# Patient Record
Sex: FEMALE | Race: WHITE | NOT HISPANIC OR LATINO | Employment: FULL TIME | URBAN - METROPOLITAN AREA
[De-identification: names, ages, dates, MRNs, and addresses within clinical notes are randomized per-mention and may not be internally consistent; named-entity substitution may affect disease eponyms.]

---

## 2024-06-12 ENCOUNTER — HOSPITAL ENCOUNTER (OUTPATIENT)
Dept: CARDIOLOGY | Facility: HOSPITAL | Age: 29
Discharge: HOME | End: 2024-06-12
Payer: COMMERCIAL

## 2024-06-12 ENCOUNTER — HOSPITAL ENCOUNTER (OUTPATIENT)
Dept: NEUROLOGY | Facility: HOSPITAL | Age: 29
Discharge: HOME | End: 2024-06-12
Payer: COMMERCIAL

## 2024-06-12 VITALS — BODY MASS INDEX: 45.91 KG/M2 | WEIGHT: 275.57 LBS | HEIGHT: 65 IN

## 2024-06-12 DIAGNOSIS — R20.0 NUMBNESS AND TINGLING: ICD-10-CM

## 2024-06-12 DIAGNOSIS — R20.2 NUMBNESS AND TINGLING: ICD-10-CM

## 2024-06-12 DIAGNOSIS — I10 ESSENTIAL HYPERTENSION: ICD-10-CM

## 2024-06-12 LAB
ASCENDING AORTA: 2.6 CM
BH CV ECHO MEAS - AO MAX PG: 6.3 MMHG
BH CV ECHO MEAS - AO MEAN PG: 4 MMHG
BH CV ECHO MEAS - AO ROOT DIAM: 2.8 CM
BH CV ECHO MEAS - AO V2 MAX: 125.1 CM/SEC
BH CV ECHO MEAS - AO V2 VTI: 26 CM
BH CV ECHO MEAS - EF(MOD-BP): 61.9 %
BH CV ECHO MEAS - IVS/LVPW: 1 CM
BH CV ECHO MEAS - IVSD: 0.8 CM
BH CV ECHO MEAS - LA DIMENSION: 4 CM
BH CV ECHO MEAS - LAT PEAK E' VEL: 16.5 CM/SEC
BH CV ECHO MEAS - LV MAX PG: 4.7 MMHG
BH CV ECHO MEAS - LV MEAN PG: 2.3 MMHG
BH CV ECHO MEAS - LV V1 MAX: 108.2 CM/SEC
BH CV ECHO MEAS - LV V1 VTI: 22.8 CM
BH CV ECHO MEAS - LVIDD: 4.4 CM
BH CV ECHO MEAS - LVIDS: 2.9 CM
BH CV ECHO MEAS - LVOT DIAM: 2 CM
BH CV ECHO MEAS - LVPWD: 0.8 CM
BH CV ECHO MEAS - MED PEAK E' VEL: 11.6 CM/SEC
BH CV ECHO MEAS - MV A MAX VEL: 40.2 CM/SEC
BH CV ECHO MEAS - MV E MAX VEL: 92.9 CM/SEC
BH CV ECHO MEAS - MV E/A: 2.31
BH CV ECHO MEAS - MV MAX PG: 4.2 MMHG
BH CV ECHO MEAS - MV MEAN PG: 1.4 MMHG
BH CV ECHO MEAS - PA ACC TIME: 0.12 SEC
BH CV ECHO MEAS - RAP SYSTOLE: 3 MMHG
BH CV ECHO MEAS - RVSP: 24 MMHG
BH CV ECHO MEAS - TAPSE (>1.6): 1.92 CM
BH CV ECHO MEAS - TR MAX PG: 21 MMHG
BH CV ECHO MEAS - TR MAX VEL: 217.1 CM/SEC
BH CV ECHO MEASUREMENTS AVERAGE E/E' RATIO: 6.61
BH CV VAS BP RIGHT ARM: NORMAL MMHG
BH CV XLRA - RV BASE: 4.09 CM
BH CV XLRA - RV LENGTH: 6.98 CM
BH CV XLRA - RV MID: 2.84 CM
BH CV XLRA - TDI S': 11 CM/SEC

## 2024-06-12 PROCEDURE — 93306 TTE W/DOPPLER COMPLETE: CPT

## 2024-06-12 PROCEDURE — 95913 NRV CNDJ TEST 13/> STUDIES: CPT

## 2024-06-12 PROCEDURE — 95886 MUSC TEST DONE W/N TEST COMP: CPT | Performed by: PSYCHIATRY & NEUROLOGY

## 2024-06-12 PROCEDURE — 93306 TTE W/DOPPLER COMPLETE: CPT | Performed by: INTERNAL MEDICINE

## 2024-06-12 PROCEDURE — 95886 MUSC TEST DONE W/N TEST COMP: CPT

## 2024-06-12 PROCEDURE — 95913 NRV CNDJ TEST 13/> STUDIES: CPT | Performed by: PSYCHIATRY & NEUROLOGY

## 2024-06-13 ENCOUNTER — TELEPHONE (OUTPATIENT)
Dept: INTERNAL MEDICINE | Facility: CLINIC | Age: 29
End: 2024-06-13
Payer: COMMERCIAL

## 2024-06-13 NOTE — TELEPHONE ENCOUNTER
Patient viewed results on [employer]      ----- Message from Emil Miller sent at 6/13/2024  8:07 AM EDT -----  Nerve conduction study shows neuropathy.  Recommend referral to neurology for further evaluation and treatment options.

## 2024-07-04 DIAGNOSIS — R20.0 NUMBNESS AND TINGLING: Primary | ICD-10-CM

## 2024-07-04 DIAGNOSIS — R20.2 NUMBNESS AND TINGLING: Primary | ICD-10-CM

## 2024-07-08 ENCOUNTER — HOSPITAL ENCOUNTER (EMERGENCY)
Facility: HOSPITAL | Age: 29
Discharge: HOME | End: 2024-07-08
Attending: EMERGENCY MEDICINE
Payer: COMMERCIAL

## 2024-07-08 VITALS
OXYGEN SATURATION: 99 % | HEART RATE: 88 BPM | WEIGHT: 273 LBS | SYSTOLIC BLOOD PRESSURE: 126 MMHG | BODY MASS INDEX: 45.48 KG/M2 | TEMPERATURE: 98.2 F | DIASTOLIC BLOOD PRESSURE: 77 MMHG | HEIGHT: 65 IN | RESPIRATION RATE: 18 BRPM

## 2024-07-08 DIAGNOSIS — I80.8 SUPERFICIAL THROMBOPHLEBITIS OF LEFT UPPER EXTREMITY: Primary | ICD-10-CM

## 2024-07-08 DIAGNOSIS — Z98.890 HISTORY OF GYNECOLOGIC SURGERY: ICD-10-CM

## 2024-07-08 LAB
ALBUMIN SERPL-MCNC: 3.6 G/DL (ref 3.5–5.2)
ALBUMIN/GLOB SERPL: 1.2 G/DL
ALP SERPL-CCNC: 74 U/L (ref 39–117)
ALT SERPL W P-5'-P-CCNC: 21 U/L (ref 1–33)
ANION GAP SERPL CALCULATED.3IONS-SCNC: 12 MMOL/L (ref 5–15)
AST SERPL-CCNC: 13 U/L (ref 1–32)
BASOPHILS # BLD AUTO: 0.04 10*3/MM3 (ref 0–0.2)
BASOPHILS NFR BLD AUTO: 0.3 % (ref 0–1.5)
BILIRUB SERPL-MCNC: <0.2 MG/DL (ref 0–1.2)
BUN SERPL-MCNC: 13 MG/DL (ref 6–20)
BUN/CREAT SERPL: 14.6 (ref 7–25)
CALCIUM SPEC-SCNC: 8.9 MG/DL (ref 8.6–10.5)
CHLORIDE SERPL-SCNC: 109 MMOL/L (ref 98–107)
CO2 SERPL-SCNC: 20 MMOL/L (ref 22–29)
CREAT SERPL-MCNC: 0.89 MG/DL (ref 0.57–1)
CRP SERPL-MCNC: 1.93 MG/DL (ref 0–0.5)
DEPRECATED RDW RBC AUTO: 40.9 FL (ref 37–54)
EGFRCR SERPLBLD CKD-EPI 2021: 89.6 ML/MIN/1.73
EOSINOPHIL # BLD AUTO: 0.2 10*3/MM3 (ref 0–0.4)
EOSINOPHIL NFR BLD AUTO: 1.4 % (ref 0.3–6.2)
ERYTHROCYTE [DISTWIDTH] IN BLOOD BY AUTOMATED COUNT: 12.5 % (ref 12.3–15.4)
ERYTHROCYTE [SEDIMENTATION RATE] IN BLOOD: 18 MM/HR (ref 0–20)
GLOBULIN UR ELPH-MCNC: 3.1 GM/DL
GLUCOSE SERPL-MCNC: 86 MG/DL (ref 65–99)
HCT VFR BLD AUTO: 40.3 % (ref 34–46.6)
HGB BLD-MCNC: 13.7 G/DL (ref 12–15.9)
IMM GRANULOCYTES # BLD AUTO: 0.05 10*3/MM3 (ref 0–0.05)
IMM GRANULOCYTES NFR BLD AUTO: 0.4 % (ref 0–0.5)
LYMPHOCYTES # BLD AUTO: 3.69 10*3/MM3 (ref 0.7–3.1)
LYMPHOCYTES NFR BLD AUTO: 26.7 % (ref 19.6–45.3)
MCH RBC QN AUTO: 30.4 PG (ref 26.6–33)
MCHC RBC AUTO-ENTMCNC: 34 G/DL (ref 31.5–35.7)
MCV RBC AUTO: 89.6 FL (ref 79–97)
MONOCYTES # BLD AUTO: 0.9 10*3/MM3 (ref 0.1–0.9)
MONOCYTES NFR BLD AUTO: 6.5 % (ref 5–12)
NEUTROPHILS NFR BLD AUTO: 64.7 % (ref 42.7–76)
NEUTROPHILS NFR BLD AUTO: 8.92 10*3/MM3 (ref 1.7–7)
NRBC BLD AUTO-RTO: 0 /100 WBC (ref 0–0.2)
PLATELET # BLD AUTO: 330 10*3/MM3 (ref 140–450)
PMV BLD AUTO: 10.8 FL (ref 6–12)
POTASSIUM SERPL-SCNC: 4.3 MMOL/L (ref 3.5–5.2)
PROT SERPL-MCNC: 6.7 G/DL (ref 6–8.5)
RBC # BLD AUTO: 4.5 10*6/MM3 (ref 3.77–5.28)
SODIUM SERPL-SCNC: 141 MMOL/L (ref 136–145)
WBC NRBC COR # BLD AUTO: 13.8 10*3/MM3 (ref 3.4–10.8)

## 2024-07-08 PROCEDURE — 96374 THER/PROPH/DIAG INJ IV PUSH: CPT

## 2024-07-08 PROCEDURE — 80053 COMPREHEN METABOLIC PANEL: CPT | Performed by: EMERGENCY MEDICINE

## 2024-07-08 PROCEDURE — 86140 C-REACTIVE PROTEIN: CPT | Performed by: EMERGENCY MEDICINE

## 2024-07-08 PROCEDURE — 99283 EMERGENCY DEPT VISIT LOW MDM: CPT

## 2024-07-08 PROCEDURE — 25810000003 SODIUM CHLORIDE 0.9 % SOLUTION: Performed by: EMERGENCY MEDICINE

## 2024-07-08 PROCEDURE — 36415 COLL VENOUS BLD VENIPUNCTURE: CPT

## 2024-07-08 PROCEDURE — 85652 RBC SED RATE AUTOMATED: CPT | Performed by: EMERGENCY MEDICINE

## 2024-07-08 PROCEDURE — 25010000002 LORAZEPAM PER 2 MG: Performed by: EMERGENCY MEDICINE

## 2024-07-08 PROCEDURE — 85025 COMPLETE CBC W/AUTO DIFF WBC: CPT | Performed by: EMERGENCY MEDICINE

## 2024-07-08 RX ORDER — SODIUM CHLORIDE 0.9 % (FLUSH) 0.9 %
10 SYRINGE (ML) INJECTION AS NEEDED
Status: DISCONTINUED | OUTPATIENT
Start: 2024-07-08 | End: 2024-07-09 | Stop reason: HOSPADM

## 2024-07-08 RX ORDER — CEPHALEXIN 250 MG/1
500 CAPSULE ORAL ONCE
Status: COMPLETED | OUTPATIENT
Start: 2024-07-08 | End: 2024-07-08

## 2024-07-08 RX ORDER — CEPHALEXIN 500 MG/1
500 CAPSULE ORAL 2 TIMES DAILY
Qty: 14 CAPSULE | Refills: 0 | Status: SHIPPED | OUTPATIENT
Start: 2024-07-08

## 2024-07-08 RX ORDER — LORAZEPAM 2 MG/ML
0.5 INJECTION INTRAMUSCULAR ONCE
Status: COMPLETED | OUTPATIENT
Start: 2024-07-08 | End: 2024-07-08

## 2024-07-08 RX ADMIN — CEPHALEXIN 500 MG: 250 CAPSULE ORAL at 21:27

## 2024-07-08 RX ADMIN — SODIUM CHLORIDE 1000 ML: 9 INJECTION, SOLUTION INTRAVENOUS at 21:28

## 2024-07-08 RX ADMIN — LORAZEPAM 0.5 MG: 2 INJECTION INTRAMUSCULAR; INTRAVENOUS at 21:31

## 2024-07-08 NOTE — Clinical Note
[facility] EMERGENCY DEPARTMENT  [address]  [facility] [address]  Phone: [phone]    Tigist Ramos was seen and treated in our emergency department on 7/8/2024.  She may return to work on 07/10/2024.         Thank you for choosing [facility].    Chaya Cheng, LOGAN

## 2024-07-08 NOTE — Clinical Note
[facility] EMERGENCY DEPARTMENT  [address]  [facility] [address]  Phone: [phone]    Tigist Ramos was seen and treated in our emergency department on 7/8/2024.  She may return to work on 07/10/2024.         Thank you for choosing [facility].    Chaya Cheng, LOGAN

## 2024-07-08 NOTE — ED PROVIDER NOTES
Subjective   History of Present Illness  28-year-old female that presents the ER with left hand soreness and confluent, tender, superficial varicose vein that is erythematous up to the mid forearm.  Patient said that she underwent sterilization procedure, laparoscopic bilateral salpingectomy at  per Dr. Flavia Barber on 7/3/2024.  The following day she noticed that there was some linear erythema following a vein to the left wrist extending to the left forearm.  She said that her arm felt sore but there was no significant soft tissue swelling.  She denies any chest pain or shortness of breath.  She contacted gynecologist and they recommended that she come to the ER for further assessment.  She denies personal history of superficial thrombophlebitis or DVT.  She has tried ibuprofen 600 mg with some relief.  Past medical history is significant for hypertriglyceridemia and obesity, ADHD, anxiety/depression. Pt is right handed.    History provided by:  Patient  Hand Pain  Location:  Left hand pain, concern for thrombophlebitis  Duration:  4 days  Progression:  Worsening  Chronicity:  New  Context:  Patient had bilateral salpingectomy on 7/3/2024 and IV was placed to dorsal aspect of left hand.  The following day on 7/4/2024, patient reports sore, palpable, linear cord to the left forearm with erythema.  No systemic symptoms.  Ineffective treatments:  Ibuprofen  Associated symptoms: no abdominal pain, no chest pain, no diarrhea, no fatigue, no fever, no myalgias, no nausea, no shortness of breath and no vomiting        Review of Systems   Constitutional: Negative.  Negative for activity change, appetite change, chills, diaphoresis, fatigue and fever.   Respiratory: Negative.  Negative for shortness of breath.    Cardiovascular: Negative.  Negative for chest pain, palpitations and leg swelling.   Gastrointestinal: Negative.  Negative for abdominal pain, diarrhea, nausea and vomiting.   Genitourinary:  Negative for  dysuria and flank pain.        Recent bilateral salpingectomy for sterilization procedure per Dr. Barber on 7/3/2024 at    Musculoskeletal: Negative.  Negative for joint swelling and myalgias.   Skin:  Positive for color change.        Patient has erythematous, linear, tender cord from extensor aspect of medial left wrist extending to the mid forearm which appears to be consistent with superficial thrombophlebitis   Neurological: Negative.    All other systems reviewed and are negative.      Past Medical History:   Diagnosis Date    ADHD (attention deficit hyperactivity disorder) 2021 or 2022    Have not been formally diagnosed, but have been screened twice and psychiatrist agrees. Taking non stimulant medication for treatment    Allergic 2015    Seasonal    Anxiety 2019    Formal diagnosis. Taking medication and weekly therapy as treatment    Depression 2019    Formally diagnosed. Weekly therapy as treatment    Hyperlipidemia 4/5/2024    Hypertension 2022    I suspect it is mostly anxiety related, not formally diagnosed, 142/90 last time checked in August    Obesity Unknown    Since childhood. Most of family is obese. Am currently addressing with Dietitian.       No Known Allergies    History reviewed. No pertinent surgical history.    Family History   Problem Relation Age of Onset    Anxiety disorder Mother         I am willing to bet that the majority of my family has undiagnosed mental illness including anxiety, adhd, depression. They display all the symptoms but don't consider mental health and do not seek medical attention    Depression Mother     Drug abuse Mother         Cigarettes, casual pill-taker    Alcohol abuse Father         Father, several aunts and uncles, grandfather    Depression Father         Undiagnosed, has attempted suicide    Drug abuse Father         Cigarettes, painkillers    Heart attack Father         3 or 4 in lifetime. Smoking related    Alcohol abuse Paternal Grandfather      Depression Paternal Grandfather     Depression Brother         Two brothers have been diagnosed    Depression Brother     Hypertension Brother        Social History     Socioeconomic History    Marital status:    Tobacco Use    Smoking status: Never    Smokeless tobacco: Never   Vaping Use    Vaping status: Never Used   Substance and Sexual Activity    Alcohol use: Yes     Alcohol/week: 2.0 standard drinks of alcohol     Types: 2 Cans of beer per week     Comment: Every other week, not weekly.    Drug use: Yes     Frequency: 10.0 times per week     Types: Marijuana     Comment: Only marijuana    Sexual activity: Yes     Partners: Female, Male     Birth control/protection: Condom, Birth control pill           Objective   Physical Exam  Vitals and nursing note reviewed.   Constitutional:       General: She is not in acute distress.     Appearance: Normal appearance. She is obese. She is not ill-appearing, toxic-appearing or diaphoretic.      Comments: Pleasant talkative.  No acute sign of pain or distress.   HENT:      Head: Normocephalic and atraumatic.      Nose: Nose normal.      Mouth/Throat:      Mouth: Mucous membranes are moist.      Comments: Oral mucous membranes are moist  Eyes:      Extraocular Movements: Extraocular movements intact.      Conjunctiva/sclera: Conjunctivae normal.      Pupils: Pupils are equal, round, and reactive to light.   Cardiovascular:      Rate and Rhythm: Regular rhythm. Tachycardia present.      Pulses: Normal pulses.           Radial pulses are 2+ on the right side and 2+ on the left side.      Heart sounds: Normal heart sounds.      Comments: Tachycardia without ectopy.  No pedal edema to lower extremity.  Strong bilateral radial and ulnar pulses and brisk capillary refill  Pulmonary:      Effort: Pulmonary effort is normal. No tachypnea or accessory muscle usage.      Breath sounds: Normal breath sounds. No decreased breath sounds.      Comments: Lungs are clear to  auscultation bilaterally.  Regular respiratory effort  Abdominal:      General: Bowel sounds are normal. There is no distension.      Palpations: Abdomen is soft.      Tenderness: There is no abdominal tenderness. There is no right CVA tenderness, left CVA tenderness, guarding or rebound.      Comments: Abdomen soft and nontender.  No flank or CVA tenderness   Musculoskeletal:         General: Normal range of motion.      Cervical back: Normal range of motion and neck supple.      Right lower leg: No edema.      Left lower leg: No edema.   Skin:     General: Skin is warm and dry.      Findings: Erythema present.      Comments: Patient has a linear erythema, tender cord to the extensor aspect of left wrist that extends to the medial left forearm.  Exam is consistent with superficial thrombophlebitis.  There is no soft tissue swelling of the left hand or forearm or upper extremity.  There is no evidence of cellulitis.   Neurological:      General: No focal deficit present.      Mental Status: She is alert and oriented to person, place, and time.      Cranial Nerves: Cranial nerves 2-12 are intact.      Sensory: Sensation is intact.      Motor: Motor function is intact.      Coordination: Coordination is intact.      Comments: Neuro intact and nonfocal         Procedures           ED Course  ED Course as of 07/13/24 0359   Mon Jul 08, 2024 2121 Patient is afebrile.  Heart rate is 139.  O2 sat is 99% on room air.  CBC shows white blood cell count of 13,000 with normal differential.  Chemistries were within normal limits.  Sed rate is 18, normal, and CRP is 1.93.  Reviewed the case and all diagnostic workup with Dr. Crespo, ER attending physician.  Will give IV fluid bolus and Ativan 0.5 mg IV and a dose of Keflex 500 mg by mouth for evidence of superficial thrombophlebitis.  [FC]   2220 Heart rate improved with IV fluids and medications.  Last heart rate was 88.  O2 sat has been around 99% on room air throughout the ER  "course.  Exam is consistent with superficial thrombophlebitis.  We will prescribe Keflex 500 mg by mouth twice daily x 7 days.   no evidence of acute cellulitis of the left upper extremity.  Reviewed all diagnostic workup with Dr. Crespo, ER attending physician.  We will bring patient back tomorrow for ultrasound of the left upper extremity.  Return to the ER sooner if worsening symptoms. [FC]   Tue Jul 09, 2024   1703 Jorje Paredes MD  I was called by the vascular technician who performed the duplex ultrasound on this patient.  There is superficial thrombophlebitis but no evidence of DVT.  I have passed along the recommendations for superficial thrombophlebitis treatment to the radiology technician who will pass this along to the patient.  I have encouraged very close outpatient follow-up with PCP. [MS]      ED Course User Index  [FC] Chaya Cheng PA-C  [MS] Jorje Paredes MD                                 No results found for this or any previous visit (from the past 24 hour(s)).    Note: In addition to lab results from this visit, the labs listed above may include labs taken at another facility or during a different encounter within the last 24 hours. Please correlate lab times with ED admission and discharge times for further clarification of the services performed during this visit.    No orders to display     Vitals:    07/08/24 1732 07/08/24 2131 07/08/24 2200   BP: (!) 138/115 127/87 126/77   BP Location: Right arm     Patient Position: Sitting     Pulse: (!) 139 101 88   Resp: 18     Temp: 98.2 °F (36.8 °C)     TempSrc: Oral     SpO2: 99% 96% 99%   Weight: 124 kg (273 lb)     Height: 165.1 cm (65\")       Medications   sodium chloride 0.9 % bolus 1,000 mL (0 mL Intravenous Stopped 7/8/24 2229)   LORazepam (ATIVAN) injection 0.5 mg (0.5 mg Intravenous Given 7/8/24 2131)   cephalexin (KEFLEX) capsule 500 mg (500 mg Oral Given 7/8/24 2127)     ECG/EMG Results (last 24 hours)       ** No results found for " the last 24 hours. **          No orders to display     Wells' Criteria for DVT - MDCalc  Calculated on Jul 08 2024 10:26 PM  -2 points -> Low risk group for DVT. “Unlikely” according to Wells’ DVT studies.              Medical Decision Making  Problems Addressed:  History of gynecologic surgery: complicated acute illness or injury  Superficial thrombophlebitis of left upper extremity: complicated acute illness or injury    Amount and/or Complexity of Data Reviewed  Labs: ordered.    Risk  Prescription drug management.        Final diagnoses:   Superficial thrombophlebitis of left upper extremity   History of gynecologic surgery       ED Disposition  ED Disposition       ED Disposition   Discharge    Condition   Stable    Comment   --               Emil Miller PA-C  [address]  [address]  [place] [id number] [phone]    Schedule an appointment as soon as possible for a visit   Close PCP follow-up within 24 to 48 hours    [facility] EMERGENCY DEPARTMENT  [address]  [facility] [address] [phone]    If symptoms worsen         Medication List        New Prescriptions      cephalexin 500 MG capsule  Commonly known as: KEFLEX  Take 1 capsule by mouth 2 (Two) Times a Day.               Where to Get Your Medications        These medications were sent to [id number] - [place], [place] - [address] - [phone]  - [phone] FX  [address] [facility] [facility] [address]      Phone: [phone]   cephalexin 500 MG capsule            Chaya Cheng PA-C  07/08/24 2226       Chaya Cheng PA-C  07/13/24 0353

## 2024-07-09 ENCOUNTER — HOSPITAL ENCOUNTER (OUTPATIENT)
Dept: CARDIOLOGY | Facility: HOSPITAL | Age: 29
Discharge: HOME | End: 2024-07-09
Admitting: PHYSICIAN ASSISTANT
Payer: COMMERCIAL

## 2024-07-09 VITALS — BODY MASS INDEX: 45.55 KG/M2 | HEIGHT: 65 IN | WEIGHT: 273.37 LBS

## 2024-07-09 DIAGNOSIS — I80.8 SUPERFICIAL THROMBOPHLEBITIS OF LEFT UPPER EXTREMITY: ICD-10-CM

## 2024-07-09 DIAGNOSIS — Z98.890 HISTORY OF GYNECOLOGIC SURGERY: ICD-10-CM

## 2024-07-09 LAB
BH CV UPPER VENOUS LEFT AXILLARY AUGMENT: NORMAL
BH CV UPPER VENOUS LEFT AXILLARY COMPRESS: NORMAL
BH CV UPPER VENOUS LEFT AXILLARY PHASIC: NORMAL
BH CV UPPER VENOUS LEFT AXILLARY SPONT: NORMAL
BH CV UPPER VENOUS LEFT BASILIC FOREARM COMPRESS: NORMAL
BH CV UPPER VENOUS LEFT BASILIC UPPER COMPRESS: NORMAL
BH CV UPPER VENOUS LEFT BRACHIAL COMPRESS: NORMAL
BH CV UPPER VENOUS LEFT CEPHALIC FOREARM COLOR: 1
BH CV UPPER VENOUS LEFT CEPHALIC FOREARM COMPRESS: NORMAL
BH CV UPPER VENOUS LEFT CEPHALIC FOREARM THROMBUS: NORMAL
BH CV UPPER VENOUS LEFT CEPHALIC UPPER COLOR: 1
BH CV UPPER VENOUS LEFT CEPHALIC UPPER COMPRESS: NORMAL
BH CV UPPER VENOUS LEFT CEPHALIC UPPER THROMBUS: NORMAL
BH CV UPPER VENOUS LEFT MED CUBITAL COLOR: 1
BH CV UPPER VENOUS LEFT MED CUBITAL COMPRESS: NORMAL
BH CV UPPER VENOUS LEFT MED CUBITAL THROMBUS: NORMAL
BH CV UPPER VENOUS LEFT RADIAL COMPRESS: NORMAL
BH CV UPPER VENOUS LEFT SUBCLAVIAN AUGMENT: NORMAL
BH CV UPPER VENOUS LEFT SUBCLAVIAN PHASIC: NORMAL
BH CV UPPER VENOUS LEFT SUBCLAVIAN SPONT: NORMAL
BH CV UPPER VENOUS LEFT ULNAR COMPRESS: NORMAL
BH CV UPPER VENOUS RIGHT SUBCLAVIAN PHASIC: NORMAL
BH CV UPPER VENOUS RIGHT SUBCLAVIAN SPONT: NORMAL
BH CV VAS PRELIMINARY FINDINGS SCRIPTING: 1

## 2024-07-09 PROCEDURE — 93971 EXTREMITY STUDY: CPT | Performed by: INTERNAL MEDICINE

## 2024-07-09 PROCEDURE — 93971 EXTREMITY STUDY: CPT

## 2024-07-09 NOTE — DISCHARGE INSTRUCTIONS
Patient had recent IV placed to the left hand for gynecologic surgery on 7/3/2024.  Patient has evidence of superficial thrombophlebitis.  Recommend cool compresses and ibuprofen every 6 hours as needed for pain/inflammation.  We will prescribe Keflex 500 mg by mouth twice daily x 7 days.  I have ordered outpatient ultrasound of the left upper extremity tomorrow to rule out any DVT.  Return to the ER sooner if worsening symptoms.  We will print off instructions for ultrasound tomorrow.    If you have a Duplex Venous study ordered and have not received a phone call to schedule this test by 10:00 am tomorrow, please call.    [phone] (Monday - Friday)    [phone] (Weekends)

## 2024-07-15 ENCOUNTER — HOSPITAL ENCOUNTER (OUTPATIENT)
Dept: CARDIOLOGY | Facility: HOSPITAL | Age: 29
Discharge: HOME | End: 2024-07-15
Admitting: PHYSICIAN ASSISTANT
Payer: COMMERCIAL

## 2024-07-15 ENCOUNTER — TELEPHONE (OUTPATIENT)
Dept: CARDIOLOGY | Facility: CLINIC | Age: 29
End: 2024-07-15

## 2024-07-15 VITALS — WEIGHT: 273.37 LBS | HEIGHT: 65 IN | BODY MASS INDEX: 45.55 KG/M2

## 2024-07-15 DIAGNOSIS — M79.601 RIGHT ARM PAIN: ICD-10-CM

## 2024-07-15 DIAGNOSIS — I80.8 SUPERFICIAL THROMBOPHLEBITIS OF LEFT UPPER EXTREMITY: Primary | ICD-10-CM

## 2024-07-15 DIAGNOSIS — I82.611 SUPERFICIAL VENOUS THROMBOSIS OF RIGHT UPPER EXTREMITY: ICD-10-CM

## 2024-07-15 DIAGNOSIS — M79.601 RIGHT ARM PAIN: Primary | ICD-10-CM

## 2024-07-15 LAB
BH CV UPPER VENOUS LEFT SUBCLAVIAN AUGMENT: NORMAL
BH CV UPPER VENOUS LEFT SUBCLAVIAN PHASIC: NORMAL
BH CV UPPER VENOUS LEFT SUBCLAVIAN SPONT: NORMAL
BH CV UPPER VENOUS RIGHT AXILLARY AUGMENT: NORMAL
BH CV UPPER VENOUS RIGHT AXILLARY COMPRESS: NORMAL
BH CV UPPER VENOUS RIGHT AXILLARY PHASIC: NORMAL
BH CV UPPER VENOUS RIGHT AXILLARY SPONT: NORMAL
BH CV UPPER VENOUS RIGHT BASILIC FOREARM COMPRESS: NORMAL
BH CV UPPER VENOUS RIGHT BASILIC UPPER COMPRESS: NORMAL
BH CV UPPER VENOUS RIGHT BRACHIAL AUGMENT: NORMAL
BH CV UPPER VENOUS RIGHT BRACHIAL COMPRESS: NORMAL
BH CV UPPER VENOUS RIGHT BRACHIAL PHASIC: NORMAL
BH CV UPPER VENOUS RIGHT BRACHIAL SPONT: NORMAL
BH CV UPPER VENOUS RIGHT CEPHALIC FOREARM COLOR: 1
BH CV UPPER VENOUS RIGHT CEPHALIC FOREARM COMPRESS: NORMAL
BH CV UPPER VENOUS RIGHT CEPHALIC UPPER COLOR: 1
BH CV UPPER VENOUS RIGHT CEPHALIC UPPER COMPRESS: NORMAL
BH CV UPPER VENOUS RIGHT INTERNAL JUGULAR COMPRESS: NORMAL
BH CV UPPER VENOUS RIGHT INTERNAL JUGULAR PHASIC: NORMAL
BH CV UPPER VENOUS RIGHT INTERNAL JUGULAR SPONT: NORMAL
BH CV UPPER VENOUS RIGHT RADIAL COMPRESS: NORMAL
BH CV UPPER VENOUS RIGHT SUBCLAVIAN AUGMENT: NORMAL
BH CV UPPER VENOUS RIGHT SUBCLAVIAN PHASIC: NORMAL
BH CV UPPER VENOUS RIGHT SUBCLAVIAN SPONT: NORMAL
BH CV UPPER VENOUS RIGHT ULNAR COMPRESS: NORMAL
BH CV VAS PRELIMINARY FINDINGS SCRIPTING: 1

## 2024-07-15 PROCEDURE — 93971 EXTREMITY STUDY: CPT | Performed by: INTERNAL MEDICINE

## 2024-07-15 PROCEDURE — 93971 EXTREMITY STUDY: CPT

## 2024-07-15 NOTE — TELEPHONE ENCOUNTER
Looks like Emil Miller PAC, pts primary is trying to get a hold of pt to discuss results. Will forward to  as FYI.    Thanks,

## 2024-07-15 NOTE — TELEPHONE ENCOUNTER
Caller: Tigist Ramos    Relationship: Self    Best call back number: [phone]    Caller requesting test results: SELF    What test was performed: DUPLEX VENOUS UPPER EXTREMITY    When was the test performed: 07.15.24    Where was the test performed: [facility]    Additional notes: PLEASE REACH OUT TO PT WHEN RESULTS ARE READY

## 2024-07-16 ENCOUNTER — OFFICE VISIT (OUTPATIENT)
Dept: SLEEP MEDICINE | Facility: CLINIC | Age: 29
End: 2024-07-16
Payer: COMMERCIAL

## 2024-07-16 VITALS
WEIGHT: 270 LBS | DIASTOLIC BLOOD PRESSURE: 80 MMHG | BODY MASS INDEX: 44.98 KG/M2 | TEMPERATURE: 98.7 F | SYSTOLIC BLOOD PRESSURE: 118 MMHG | OXYGEN SATURATION: 97 % | HEART RATE: 89 BPM | HEIGHT: 65 IN

## 2024-07-16 DIAGNOSIS — R29.818 SUSPECTED SLEEP APNEA: Primary | ICD-10-CM

## 2024-07-16 DIAGNOSIS — I10 ESSENTIAL HYPERTENSION: ICD-10-CM

## 2024-07-16 DIAGNOSIS — G47.19 EXCESSIVE DAYTIME SLEEPINESS: ICD-10-CM

## 2024-07-16 PROCEDURE — 99213 OFFICE O/P EST LOW 20 MIN: CPT | Performed by: NURSE PRACTITIONER

## 2024-07-16 NOTE — PROGRESS NOTES
Chief Complaint:   Chief Complaint   Patient presents with    Sleeping Problem    Fatigue    Snoring     Occasional snoring       HPI:    Tigist Ramos is a 28 y.o. female here to establish care.  Patient sees Dr. Munoz and LAUREN Addison for care.  Patient has medical history as below:  Past Medical History:   Diagnosis Date    ADHD (attention deficit hyperactivity disorder) 2021 or 2022    Have not been formally diagnosed, but have been screened twice and psychiatrist agrees. Taking non stimulant medication for treatment    Allergic 2015    Seasonal    Anxiety 2019    Formal diagnosis. Taking medication and weekly therapy as treatment    Depression 2019    Formally diagnosed. Weekly therapy as treatment    Hyperlipidemia 4/5/2024    Hypertension 2022    I suspect it is mostly anxiety related, not formally diagnosed, 142/90 last time checked in August    Obesity Unknown    Since childhood. Most of family is obese. Am currently addressing with Dietitian.         Patient has a 1 to 2-year history of feeling tired in the afternoon and early evening she does have gasping at times, nasal allergies, frequent nighttime urination, difficulty staying asleep, sleep talking and very vivid dreams.  Patient has no history of nasal fracture, hypnagogic hallucinations or sleep paralysis.  She does feel she had a concussion in college.    On the weekday patient going to bed between 11:30 PM and 12 AM getting up between 9 and 10 AM.  On the weekend going to bed 11:30 PM to 12 AM getting up anywhere from 8 to 10 AM.  She estimates she is getting 8 to 9 hours of sleep nightly.  She does take a daily 2 to 4-hour nap.  She does toss and turn throughout the night and gets up to use the restroom 1-2 times.  Patient has an [place] score of 10/24.    We did speak today about the consequences of untreated sleep apnea such as hypertension, atrial fibrillation, stroke, early onset dementia and sudden cardiac death.  We also  discussed different therapies available to her such as CPAP, MAD, or ENT referral.  Patient verbalizes understanding.    Social history  Is a very pleasant 28-year-old female.  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  She is a non-smoker and has a very rare alcoholic beverage.  She does take daily marijuana.  She does drink decaffeinated coffee and occasional tea.  Patient has 4-5 deepti daily.  Family History   Problem Relation Age of Onset    Anxiety disorder Mother         I am willing to bet that the majority of my family has undiagnosed mental illness including anxiety, adhd, depression. They display all the symptoms but don't consider mental health and do not seek medical attention    Depression Mother     Drug abuse Mother         Cigarettes, casual pill-taker    Alcohol abuse Father         Father, several aunts and uncles, grandfather    Depression Father         Undiagnosed, has attempted suicide    Drug abuse Father         Cigarettes, painkillers    Heart attack Father         3 or 4 in lifetime. Smoking related    Alcohol abuse Paternal Grandfather     Depression Paternal Grandfather     Depression Brother         Two brothers have been diagnosed    Depression Brother     Hypertension Brother      .No past surgical history on file.      Current medications are:   Current Outpatient Medications:     albuterol sulfate  (90 Base) MCG/ACT inhaler, Inhale 1-2 puffs As Needed. every 4 to 6 hours, Disp: , Rfl:     cephalexin (KEFLEX) 500 MG capsule, Take 1 capsule by mouth 2 (Two) Times a Day., Disp: 14 capsule, Rfl: 0    guanFACINE (TENEX) 2 MG tablet, Take 1 tablet every day by oral route., Disp: , Rfl:     ipratropium (ATROVENT) 0.06 % nasal spray, USE 1 TO 2 SPRAYS IN EACH NOSTRIL TWICE DAILY AS NEEDED, Disp: , Rfl:     Junel FE 1.5/30 1.5-30 MG-MCG tablet, Take 1 tablet by mouth Daily., Disp: , Rfl:     Omega-3 Fatty Acids (fish oil) 1000 MG capsule capsule, Take 1 capsule by mouth Daily  With Breakfast., Disp: , Rfl:     venlafaxine XR (EFFEXOR-XR) 150 MG 24 hr capsule, Take 1 capsule by mouth Daily., Disp: , Rfl:     vitamin B-12 (CYANOCOBALAMIN) 1000 MCG tablet, Take 1 tablet by mouth Daily., Disp: 90 tablet, Rfl: 1    VITAMIN D PO, , Disp: , Rfl: .      The patient's relevant past medical, surgical, family and social history were reviewed and updated in Epic as appropriate.       Review of Systems   Constitutional:  Positive for activity change and appetite change.   HENT:  Positive for sinus pressure.    Neurological:  Positive for dizziness.   Psychiatric/Behavioral:  Positive for decreased concentration, dysphoric mood and sleep disturbance. The patient is nervous/anxious.    All other systems reviewed and are negative.        Objective:    Physical Exam  Constitutional:       Appearance: Normal appearance.   HENT:      Head: Normocephalic and atraumatic.      Mouth/Throat:      Mouth: Mucous membranes are moist.      Pharynx: Oropharynx is clear.   Cardiovascular:      Rate and Rhythm: Normal rate and regular rhythm.   Pulmonary:      Effort: Pulmonary effort is normal.      Breath sounds: Normal breath sounds.   Skin:     General: Skin is warm and dry.   Neurological:      Mental Status: She is alert and oriented to person, place, and time.   Psychiatric:         Mood and Affect: Mood normal.         Behavior: Behavior normal.         Thought Content: Thought content normal.         Judgment: Judgment normal.           ASSESSMENT/PLAN    Diagnoses and all orders for this visit:    1. Suspected sleep apnea (Primary)  -     Home Sleep Study; Future    2. Excessive daytime sleepiness  -     Home Sleep Study; Future    3. Essential hypertension  -     Home Sleep Study; Future        Counseled patient regarding multimodal approach with healthy nutrition, healthy sleep, regular physical activity, social activities, counseling, and medications. Encouraged to practice lateral sleep position. Avoid  alcohol and sedatives close to bedtime.    Patient certainly has a strong story for sleep apnea and due to the consequences of untreated sleep apnea we will move forward with HST and follow-up as appropriate.    Thank you for this kind referral.    Signed by  Maci Vo, PURA    July 16, 2024      CC: Emil Miller PA-C Corales, Danilo B, MD

## 2024-07-31 DIAGNOSIS — Z71.3 DIETARY COUNSELING: Primary | ICD-10-CM

## 2024-08-01 ENCOUNTER — TELEPHONE (OUTPATIENT)
Dept: INTERNAL MEDICINE | Facility: CLINIC | Age: 29
End: 2024-08-01
Payer: COMMERCIAL

## 2024-08-01 NOTE — TELEPHONE ENCOUNTER
CALLED ANNI YOUNGBLOOD WITH [facility] TO SEE HOW SHE PREFERS TO TAKE REFERRALS, BUT NO ANSWER SO I LVM ASKING HER TO CALL ME BACK. IF SHE CALLS BACK, PLEASE TRANSFER HER TO ME.

## 2024-08-06 ENCOUNTER — OFFICE VISIT (OUTPATIENT)
Dept: INTERNAL MEDICINE | Facility: CLINIC | Age: 29
End: 2024-08-06
Payer: COMMERCIAL

## 2024-08-06 VITALS
WEIGHT: 270.6 LBS | OXYGEN SATURATION: 98 % | SYSTOLIC BLOOD PRESSURE: 114 MMHG | DIASTOLIC BLOOD PRESSURE: 80 MMHG | HEART RATE: 78 BPM | TEMPERATURE: 98.8 F | HEIGHT: 65 IN | BODY MASS INDEX: 45.08 KG/M2

## 2024-08-06 DIAGNOSIS — J45.20 MILD INTERMITTENT ASTHMA WITHOUT COMPLICATION: ICD-10-CM

## 2024-08-06 DIAGNOSIS — F90.9 ATTENTION DEFICIT HYPERACTIVITY DISORDER (ADHD), UNSPECIFIED ADHD TYPE: ICD-10-CM

## 2024-08-06 DIAGNOSIS — R06.83 SNORING: ICD-10-CM

## 2024-08-06 DIAGNOSIS — F41.1 GAD (GENERALIZED ANXIETY DISORDER): Primary | ICD-10-CM

## 2024-08-06 PROCEDURE — 99214 OFFICE O/P EST MOD 30 MIN: CPT | Performed by: PHYSICIAN ASSISTANT

## 2024-08-06 NOTE — PROGRESS NOTES
[facility]  [address]  [place] [address]  Dept: [phone]  Dept Fax: [phone]  Loc: [phone]  Loc Fax: [phone]    Tigist Ramos  1995    Follow Up Office Visit Note    History of Present Illness:  Patient is a 28-year-old female in today to follow-up for asthma, anxiety, and ADHD.  On guanfacine as directed for ADHD without any problems or side effects.  Reports good symptomatic control.    On Effexor for anxiety.  Reports good symptomatic control.    Uses albuterol inhaler for asthma as directed.    Patient overall doing well and feeling well.        The following portions of the patient's history were reviewed and updated as appropriate: allergies, current medications, past family history, past medical history, past social history, past surgical history, and problem list.    Medications:    Current Outpatient Medications:     albuterol sulfate  (90 Base) MCG/ACT inhaler, Inhale 1-2 puffs As Needed. every 4 to 6 hours, Disp: , Rfl:     guanFACINE (TENEX) 2 MG tablet, Take 1 tablet every day by oral route., Disp: , Rfl:     ipratropium (ATROVENT) 0.06 % nasal spray, USE 1 TO 2 SPRAYS IN EACH NOSTRIL TWICE DAILY AS NEEDED, Disp: , Rfl:     Junel FE 1.5/30 1.5-30 MG-MCG tablet, Take 1 tablet by mouth Daily., Disp: , Rfl:     Omega-3 Fatty Acids (fish oil) 1000 MG capsule capsule, Take 1 capsule by mouth Daily With Breakfast., Disp: , Rfl:     venlafaxine XR (EFFEXOR-XR) 150 MG 24 hr capsule, Take 1 capsule by mouth Daily., Disp: , Rfl:     vitamin B-12 (CYANOCOBALAMIN) 1000 MCG tablet, Take 1 tablet by mouth Daily., Disp: 90 tablet, Rfl: 1    VITAMIN D PO, , Disp: , Rfl:     cephalexin (KEFLEX) 500 MG capsule, Take 1 capsule by mouth 2 (Two) Times a Day. (Patient not taking: Reported on 8/6/2024), Disp: 14 capsule, Rfl: 0    Subjective  No Known Allergies     Past Medical History:   Diagnosis Date    ADHD (attention  deficit hyperactivity disorder) 2021 or 2022    Have not been formally diagnosed, but have been screened twice and psychiatrist agrees. Taking non stimulant medication for treatment    Allergic 2015    Seasonal    Anxiety 2019    Formal diagnosis. Taking medication and weekly therapy as treatment    Depression 2019    Formally diagnosed. Weekly therapy as treatment    Hyperlipidemia 4/5/2024    Hypertension 2022    I suspect it is mostly anxiety related, not formally diagnosed, 142/90 last time checked in August    Obesity Unknown    Since childhood. Most of family is obese. Am currently addressing with Dietitian.       History reviewed. No pertinent surgical history.    Family History   Problem Relation Age of Onset    Anxiety disorder Mother         I am willing to bet that the majority of my family has undiagnosed mental illness including anxiety, adhd, depression. They display all the symptoms but don't consider mental health and do not seek medical attention    Depression Mother     Drug abuse Mother         Cigarettes, casual pill-taker    Alcohol abuse Father         Father, several aunts and uncles, grandfather    Depression Father         Undiagnosed, has attempted suicide    Drug abuse Father         Cigarettes, painkillers    Heart attack Father         3 or 4 in lifetime. Smoking related    Alcohol abuse Paternal Grandfather     Depression Paternal Grandfather     Depression Brother         Two brothers have been diagnosed    Depression Brother     Hypertension Brother         Social History     Socioeconomic History    Marital status:    Tobacco Use    Smoking status: Never    Smokeless tobacco: Never   Vaping Use    Vaping status: Never Used   Substance and Sexual Activity    Alcohol use: Yes     Alcohol/week: 2.0 standard drinks of alcohol     Types: 2 Cans of beer per week     Comment: Every other week, not weekly.    Drug use: Yes     Frequency: 10.0 times per week     Types: Marijuana      "Comment: Only marijuana    Sexual activity: Yes     Partners: Female, Male     Birth control/protection: Condom, Birth control pill       Review of Systems   Constitutional:  Negative for activity change, chills, fatigue, fever and unexpected weight change.   HENT:  Negative for congestion, ear pain, postnasal drip, sinus pressure and sore throat.    Eyes:  Negative for pain, discharge and redness.   Respiratory:  Negative for cough, shortness of breath and wheezing.    Cardiovascular:  Negative for chest pain, palpitations and leg swelling.   Gastrointestinal:  Negative for diarrhea, nausea and vomiting.   Endocrine: Negative for cold intolerance and heat intolerance.   Genitourinary:  Negative for decreased urine volume and dysuria.   Musculoskeletal:  Negative for arthralgias and myalgias.   Skin:  Negative for rash and wound.   Neurological:  Negative for dizziness, light-headedness and headaches.   Hematological:  Does not bruise/bleed easily.   Psychiatric/Behavioral:  Negative for confusion, dysphoric mood and sleep disturbance. The patient is not nervous/anxious.          Objective  Vitals:    08/06/24 1456   BP: 114/80   BP Location: Left arm   Patient Position: Sitting   Cuff Size: Large Adult   Pulse: 78   Temp: 98.8 °F (37.1 °C)   TempSrc: Temporal   SpO2: 98%   Weight: 123 kg (270 lb 9.6 oz)   Height: 165.1 cm (65\")     Body mass index is 45.03 kg/m².     Physical Exam  Physical Exam  Vitals and nursing note reviewed.   Constitutional:       General: She is not in acute distress.     Appearance: She is not ill-appearing.   HENT:      Head: Normocephalic.      Right Ear: Tympanic membrane, ear canal and external ear normal. There is no impacted cerumen.      Left Ear: Tympanic membrane, ear canal and external ear normal. There is no impacted cerumen.      Nose: No congestion or rhinorrhea.      Mouth/Throat:      Mouth: Mucous membranes are moist.      Pharynx: Oropharynx is clear. No oropharyngeal " exudate or posterior oropharyngeal erythema.   Eyes:      General:         Right eye: No discharge.         Left eye: No discharge.      Extraocular Movements: Extraocular movements intact.      Conjunctiva/sclera: Conjunctivae normal.      Pupils: Pupils are equal, round, and reactive to light.   Cardiovascular:      Rate and Rhythm: Normal rate and regular rhythm.      Heart sounds: Normal heart sounds. No murmur heard.     No friction rub. No gallop.   Pulmonary:      Effort: Pulmonary effort is normal. No respiratory distress.      Breath sounds: Normal breath sounds. No wheezing.   Abdominal:      General: Bowel sounds are normal. There is no distension.      Palpations: Abdomen is soft. There is no mass.      Tenderness: There is no abdominal tenderness.   Musculoskeletal:         General: No swelling. Normal range of motion.      Cervical back: Normal range of motion. No tenderness.      Right lower leg: No edema.      Left lower leg: No edema.   Lymphadenopathy:      Cervical: No cervical adenopathy.   Skin:     Findings: No bruising, erythema or rash.   Neurological:      Mental Status: She is oriented to person, place, and time.      Gait: Gait normal.   Psychiatric:         Mood and Affect: Mood normal.         Behavior: Behavior normal.         Thought Content: Thought content normal.         Judgment: Judgment normal.         Diagnostic Data  Procedures    Assessment  Diagnoses and all orders for this visit:    1. CAITLIN (generalized anxiety disorder) (Primary)    2. Mild intermittent asthma without complication    3. Attention deficit hyperactivity disorder (ADHD), unspecified ADHD type    4. Snoring        Plan    1. CAITLIN (generalized anxiety disorder) (Primary)- well-controlled on Effexor.  Keep same.  Continue to monitor.    2. Mild intermittent asthma without complication- stable on albuterol inhaler.    3. Attention deficit hyperactivity disorder (ADHD), unspecified ADHD type- well-controlled on  guanfacine.  Continue to follow with psych.    4. Snoring- awaiting sleep study.  Follow-up with sleep medicine.      Return in about 3 months (around 11/6/2024) for Recheck.    Emil Miller PA-C  08/06/2024

## 2024-09-05 ENCOUNTER — OFFICE VISIT (OUTPATIENT)
Dept: CARDIOLOGY | Facility: CLINIC | Age: 29
End: 2024-09-05
Payer: COMMERCIAL

## 2024-09-05 VITALS
OXYGEN SATURATION: 98 % | HEIGHT: 65 IN | SYSTOLIC BLOOD PRESSURE: 130 MMHG | DIASTOLIC BLOOD PRESSURE: 78 MMHG | HEART RATE: 97 BPM | WEIGHT: 271 LBS | BODY MASS INDEX: 45.15 KG/M2

## 2024-09-05 DIAGNOSIS — E78.2 MIXED HYPERLIPIDEMIA: Primary | ICD-10-CM

## 2024-09-05 DIAGNOSIS — R06.09 DYSPNEA ON EXERTION: ICD-10-CM

## 2024-09-05 PROCEDURE — 99213 OFFICE O/P EST LOW 20 MIN: CPT | Performed by: INTERNAL MEDICINE

## 2024-09-05 RX ORDER — KETOCONAZOLE 20 MG/ML
SHAMPOO TOPICAL
COMMUNITY
Start: 2024-06-05

## 2024-09-05 RX ORDER — IBUPROFEN 600 MG/1
TABLET, FILM COATED ORAL
COMMUNITY
Start: 2024-07-03

## 2024-09-05 RX ORDER — SULFACETAMIDE SODIUM, SULFUR 100; 50 MG/G; MG/G
EMULSION TOPICAL
COMMUNITY
Start: 2024-06-06

## 2024-09-05 NOTE — PROGRESS NOTES
Follow-up Visit      Date: 2024  Patient Name: Tigist Ramos  : 1995   MRN: [id number]     Chief Complaint:    Chief Complaint   Patient presents with    Essential hypertension       History of Present Illness: Tigist Ramos is a 28 y.o. female who is here today for follow-up on her symptoms of shortness of breath.  Patient has been trying to change her lifestyle and also has found a job.  She has been actively working and has been started feeling much better.  Patient denies any chest pain any shortness of breath any dizziness any palpitations or any other symptoms.  Patient denies any lower extremity edema.      Problem List     CARDIAC  Coronary Artery Disease:   The 10-year CVD risk score is: 2.1%  2024 Treadmill stress test, normal ECG, negative for ischemia     Myocardium:   Dyspnea   2024 Echo EF 61-65%, RVC borderline dilated, normal RVSP    Valvular:   Mild TR    Electrical:   Normal sinus rhythm with palpitations     Pericardium:   Normal     VASCULAR  Venous:  2024 Superficial thrombophlebitis HOWARD    CARDIAC RISK FACTORS  Obesity    NON-CARDIAC  ADHD  Anxiety  Depression    SURGERIES  None      Subjective      Review of Systems:   Review of Systems   Respiratory:  Negative for apnea, cough, choking, chest tightness, shortness of breath, wheezing and stridor.    Cardiovascular:  Negative for chest pain, palpitations and leg swelling.       Medications:     Current Outpatient Medications:     albuterol sulfate  (90 Base) MCG/ACT inhaler, Inhale 1-2 puffs As Needed. every 4 to 6 hours, Disp: , Rfl:     cephalexin (KEFLEX) 500 MG capsule, Take 1 capsule by mouth 2 (Two) Times a Day., Disp: 14 capsule, Rfl: 0    guanFACINE (TENEX) 2 MG tablet, Take 1 tablet every day by oral route., Disp: , Rfl:     ibuprofen (ADVIL,MOTRIN) 600 MG tablet, , Disp: , Rfl:     ipratropium (ATROVENT) 0.06 % nasal spray, USE 1 TO 2 SPRAYS IN EACH NOSTRIL TWICE DAILY AS NEEDED, Disp: ,  "Rfl:     Junel FE 1.5/30 1.5-30 MG-MCG tablet, Take 1 tablet by mouth Daily., Disp: , Rfl:     ketoconazole (NIZORAL) 2 % shampoo, APPLY TOPICALLY EVERY DAY, Disp: , Rfl:     Omega-3 Fatty Acids (fish oil) 1000 MG capsule capsule, Take 1 capsule by mouth Daily With Breakfast., Disp: , Rfl:     Sulfacetamide Sodium-Sulfur 10-5 % liquid, USE ONE APPLICATION TOPICALLY EVERY DAY., Disp: , Rfl:     venlafaxine XR (EFFEXOR-XR) 150 MG 24 hr capsule, Take 1 capsule by mouth Daily., Disp: , Rfl:     vitamin B-12 (CYANOCOBALAMIN) 1000 MCG tablet, Take 1 tablet by mouth Daily., Disp: 90 tablet, Rfl: 1    VITAMIN D PO, , Disp: , Rfl:     Allergies:   No Known Allergies    Objective     Physical Exam:  Vitals:    09/05/24 1533   BP: 130/78   BP Location: Right arm   Patient Position: Sitting   Cuff Size: Adult   Pulse: 97   SpO2: 98%   Weight: 123 kg (271 lb)   Height: 165.1 cm (65\")     Body mass index is 45.1 kg/m².    Constitutional:       General: Not in acute distress.     Appearance: Healthy appearance. Not in distress.     Neck:     JVP:Not elevated     Carotid artery: Normal    Pulmonary:      Effort: Pulmonary effort is normal.      Breath sounds: Normal breath sounds. No wheezing. No rhonchi. No rales.     Cardiovascular:      Normal rate. Regular rhythm. Normal S1. Normal S2.      Murmurs: There is no significant murmur.      No gallop. No click. No rub.     Abdominal:      General: Bowel sounds are normal.      Palpations: Abdomen is soft.      Tenderness: There is no abdominal tenderness.    Extremities:     Pulses:Normal radial and pedal pulses     Edema:no edema    Smoking Cessation:   Tobacco Product History : Patient never smoked    Lab Review:   Lab Results   Component Value Date    GLUCOSE 86 07/08/2024    BUN 13 07/08/2024    CREATININE 0.89 07/08/2024    EGFRIFNONA 89 10/22/2020    EGFRIFAFRI 102 10/22/2020    BCR 14.6 07/08/2024    K 4.3 07/08/2024    CO2 20.0 (L) 07/08/2024    CALCIUM 8.9 07/08/2024    " ALBUMIN 3.6 07/08/2024    AST 13 07/08/2024    ALT 21 07/08/2024     Lab Results   Component Value Date    WBC 13.80 (H) 07/08/2024    HGB 13.7 07/08/2024    HCT 40.3 07/08/2024    MCV 89.6 07/08/2024     07/08/2024     Lab Results   Component Value Date    TSH 2.120 02/07/2024           Assessment / Plan      Assessment:   Diagnosis Plan   1. Mixed hyperlipidemia        2. Dyspnea on exertion             Plan:  Patient cardiac workup did not show any significant abnormality.  Patient is going to continue with her current medications.  We will see her back on as-needed basis.  Patient will keep on working on healthy lifestyle, healthy food and exercise and hopefully that should help her for long period of time.      Follow Up:       Return in about 3 years (around 9/5/2027).    Lizzy Marcus MD

## 2024-09-23 ENCOUNTER — LAB (OUTPATIENT)
Dept: INTERNAL MEDICINE | Facility: CLINIC | Age: 29
End: 2024-09-23
Payer: COMMERCIAL

## 2024-09-23 DIAGNOSIS — Z00.00 HEALTH CARE MAINTENANCE: ICD-10-CM

## 2024-09-23 DIAGNOSIS — I80.8 SUPERFICIAL THROMBOPHLEBITIS OF LEFT UPPER EXTREMITY: ICD-10-CM

## 2024-09-23 DIAGNOSIS — I82.611 SUPERFICIAL VENOUS THROMBOSIS OF RIGHT UPPER EXTREMITY: ICD-10-CM

## 2024-09-23 PROCEDURE — 85305 CLOT INHIBIT PROT S TOTAL: CPT | Performed by: PHYSICIAN ASSISTANT

## 2024-09-23 PROCEDURE — 85610 PROTHROMBIN TIME: CPT | Performed by: PHYSICIAN ASSISTANT

## 2024-09-23 PROCEDURE — 85730 THROMBOPLASTIN TIME PARTIAL: CPT | Performed by: PHYSICIAN ASSISTANT

## 2024-09-23 PROCEDURE — 85670 THROMBIN TIME PLASMA: CPT | Performed by: PHYSICIAN ASSISTANT

## 2024-09-23 PROCEDURE — 86148 ANTI-PHOSPHOLIPID ANTIBODY: CPT | Performed by: PHYSICIAN ASSISTANT

## 2024-09-23 PROCEDURE — 83520 IMMUNOASSAY QUANT NOS NONAB: CPT | Performed by: PHYSICIAN ASSISTANT

## 2024-09-23 PROCEDURE — 82746 ASSAY OF FOLIC ACID SERUM: CPT | Performed by: PHYSICIAN ASSISTANT

## 2024-09-23 PROCEDURE — 85303 CLOT INHIBIT PROT C ACTIVITY: CPT | Performed by: PHYSICIAN ASSISTANT

## 2024-09-23 PROCEDURE — 80061 LIPID PANEL: CPT | Performed by: PHYSICIAN ASSISTANT

## 2024-09-23 PROCEDURE — 85732 THROMBOPLASTIN TIME PARTIAL: CPT | Performed by: PHYSICIAN ASSISTANT

## 2024-09-23 PROCEDURE — 86146 BETA-2 GLYCOPROTEIN ANTIBODY: CPT | Performed by: PHYSICIAN ASSISTANT

## 2024-09-23 PROCEDURE — 86147 CARDIOLIPIN ANTIBODY EA IG: CPT | Performed by: PHYSICIAN ASSISTANT

## 2024-09-23 PROCEDURE — 83036 HEMOGLOBIN GLYCOSYLATED A1C: CPT | Performed by: PHYSICIAN ASSISTANT

## 2024-09-23 PROCEDURE — 85598 HEXAGNAL PHOSPH PLTLT NEUTRL: CPT | Performed by: PHYSICIAN ASSISTANT

## 2024-09-23 PROCEDURE — 85613 RUSSELL VIPER VENOM DILUTED: CPT | Performed by: PHYSICIAN ASSISTANT

## 2024-09-23 PROCEDURE — 80050 GENERAL HEALTH PANEL: CPT | Performed by: PHYSICIAN ASSISTANT

## 2024-09-23 PROCEDURE — 85301 ANTITHROMBIN III ANTIGEN: CPT | Performed by: PHYSICIAN ASSISTANT

## 2024-09-23 PROCEDURE — 81241 F5 GENE: CPT | Performed by: PHYSICIAN ASSISTANT

## 2024-09-23 PROCEDURE — 85306 CLOT INHIBIT PROT S FREE: CPT | Performed by: PHYSICIAN ASSISTANT

## 2024-09-23 PROCEDURE — 85302 CLOT INHIBIT PROT C ANTIGEN: CPT | Performed by: PHYSICIAN ASSISTANT

## 2024-09-23 PROCEDURE — 36415 COLL VENOUS BLD VENIPUNCTURE: CPT | Performed by: PHYSICIAN ASSISTANT

## 2024-09-23 PROCEDURE — 82607 VITAMIN B-12: CPT | Performed by: PHYSICIAN ASSISTANT

## 2024-09-23 PROCEDURE — 85597 PHOSPHOLIPID PLTLT NEUTRALIZ: CPT | Performed by: PHYSICIAN ASSISTANT

## 2024-09-24 LAB — HBA1C MFR BLD: 5.1 % (ref 4.8–5.6)

## 2024-09-25 LAB — F5 GENE MUT ANL BLD/T: ABNORMAL

## 2024-09-26 LAB
AT III AG ACT/NOR PPP IA: 70 % (ref 72–124)
PROT C ACT/NOR PPP: 115 % (ref 73–180)
PROT C AG ACT/NOR PPP IA: 92 % (ref 60–150)
PROT S AG ACT/NOR PPP IA: 56 % (ref 60–150)
PROT S FREE AG ACT/NOR PPP IA: 90 % (ref 61–136)

## 2024-09-27 DIAGNOSIS — D68.51 FACTOR V LEIDEN: Primary | ICD-10-CM

## 2024-10-03 ENCOUNTER — PATIENT MESSAGE (OUTPATIENT)
Dept: SLEEP MEDICINE | Facility: CLINIC | Age: 29
End: 2024-10-03
Payer: COMMERCIAL

## 2024-10-03 LAB
APTT HEX PL PPP: 6 SEC
APTT IMM NP PPP: NORMAL SEC
APTT PPP 1:1 SALINE: NORMAL SEC
APTT PPP: 26.7 SEC
B2 GLYCOPROT1 IGA SER-ACNC: <10 SAU
B2 GLYCOPROT1 IGG SER-ACNC: <10 SGU
B2 GLYCOPROT1 IGM SER-ACNC: <10 SMU
CARDIOLIPIN IGG SER IA-ACNC: <10 GPL
CARDIOLIPIN IGM SER IA-ACNC: <10 MPL
CONFIRM APTT: 1.9 SEC
CONFIRM DRVVT: NORMAL SEC
DRVVT SCREEN TO CONFIRM RATIO: NORMAL RATIO
INR PPP: 0.9 RATIO
LABORATORY COMMENT REPORT: NORMAL
PROTHROMBIN TIME: 10.3 SEC
SCREEN DRVVT: 46.3 SEC
THROMBIN TIME: 17.1 SEC

## 2024-10-03 NOTE — TELEPHONE ENCOUNTER
From: Tigist Ramos  To: Maci Vo  Sent: 10/3/2024 9:49 AM EDT  Subject: Insurance struggles     Hi Doctor Gilda!    A couple months ago we met up and you recommended an at home sleep study. My insurance company deemed it not medically necessary, and I completely disagree. I would like to appeal their decision and have an email to send an appeal to.     They said I could either do it, or have my doctor do it. I would like to compile a list of my risk factors, like the heart problems seem to run on both sides of my family and how sleep so directly affects the heart (as well as other things), from my PC, as well as anything you may have to offer as a specialist.     If you have experience with this appealing to insurance companies, I am all ears to your advice.

## 2024-10-09 LAB
SERVICE CMNT-IMP: NORMAL
SPECIMEN SOURCE: NORMAL

## 2024-10-11 ENCOUNTER — TELEPHONE (OUTPATIENT)
Dept: INTERNAL MEDICINE | Facility: CLINIC | Age: 29
End: 2024-10-11
Payer: COMMERCIAL

## 2024-10-11 NOTE — TELEPHONE ENCOUNTER
Hub to relay       ----- Message from Emil Miller sent at 10/11/2024  9:51 AM EDT -----  Attempted to call patient regarding results.  No answer.  Left voicemail to call back.  Please advise patient needs to follow-up with hematology at her appointment coming up on the 17th with regards to these results.  Thank you.

## 2024-10-11 NOTE — TELEPHONE ENCOUNTER
Name: Tigist Ramos      Relationship: Self      Best Callback Number: [phone]       HUB PROVIDED THE RELAY MESSAGE FROM THE OFFICE      PATIENT: HAS FURTHER QUESTIONS AND WOULD LIKE A CALL BACK AT THE FOLLOWING PHONE [phone]    ADDITIONAL INFORMATION: ADVISED PATIENT OF MESSAGE. STILL A LITTLE CONFUSION. SHE HAS AN APPT ON 10/17/24 WITH RHEUMATOLOGY

## 2024-10-17 ENCOUNTER — CONSULT (OUTPATIENT)
Age: 29
End: 2024-10-17
Payer: COMMERCIAL

## 2024-10-17 VITALS
WEIGHT: 272 LBS | DIASTOLIC BLOOD PRESSURE: 82 MMHG | RESPIRATION RATE: 16 BRPM | HEART RATE: 84 BPM | SYSTOLIC BLOOD PRESSURE: 117 MMHG | HEIGHT: 65 IN | OXYGEN SATURATION: 99 % | BODY MASS INDEX: 45.32 KG/M2

## 2024-10-17 DIAGNOSIS — D68.51 HETEROZYGOUS FACTOR V LEIDEN MUTATION: Primary | ICD-10-CM

## 2024-10-17 PROCEDURE — 99204 OFFICE O/P NEW MOD 45 MIN: CPT | Performed by: INTERNAL MEDICINE

## 2024-10-17 RX ORDER — ASPIRIN 81 MG/1
81 TABLET ORAL DAILY
COMMUNITY

## 2024-10-17 NOTE — PROGRESS NOTES
"     Hematology and Oncology [place]  Office number [phone]    Fax number [phone]    New Patient Office Visit      Date: 10/17/2024     Patient Name: Tigist Ramos  MRN: [id number]  : 1995    Referred by: LAUREN Addison    Chief Complaint: thrombophlebitis, factor V leiden heterozygous    History of Present Illness: Tigist Ramos is a pleasant 28 y.o. female who presents today for evaluation of superficial thrombophlebitis and FVL heterozygous.    She has had 2 episodes of thrombophlebitis prompting additional workup.  She underwent bilateral salpingectomy 7/3/2024 with Dr. Harini Barber as an outpatient at [facility]. Had an IV in her left arm, was discharged same day. Continued OCP in perioperative period.     Went to the ED  with swelling and redness and pain in left forearm. Per notes; \"Exam is consistent with superficial thrombophlebitis. We will prescribe Keflex 500 mg by mouth twice daily x 7 days\" and started  daily. Duplex the next day showed evidence of superficial thrombophlebitis.     Had IV placed in ED on right arm 24 and developed similar symptoms in her right arm in the days after her ED visit. On 7/15/24 she presented with right arm swelling, pain and redness and duplex Showed: acute right upper extremity superficial thrombophlebitis noted in the cephalic (upper arm) and cephalic (forearm). Continued  mg daily until symptom resolution and now on ASA 81 mg daily.     Has been on OCPs for 5 years for management of PCOS, and stopped OCPs at diagnosis of FVL heterozygous. Smoked and vaped marijuana daily until diagnosis, now cutting down. Does not use tobacco products.     She is a G0    The patients family history is notable for CVD but no VTE.   Father had PAD, several Mis starting in his late 30s or early 40s, needed amputation, he has lots of medical problems including smoking.  No known VTE.   Grandmother heart disease    She has seen cardiology in " past and had stress test and echo.       Review of Systems:   I have reviewed the review of systems completed by the patient. This is negative for clinically significant symptoms except as noted below. This document has been scanned into the patient's chart.  Chronic cough  Depression  Numbness,   Peripheral neuropathy    Past Medical History:   Past Medical History:   Diagnosis Date    ADHD (attention deficit hyperactivity disorder) 2021 or 2022    Have not been formally diagnosed, but have been screened twice and psychiatrist agrees. Taking non stimulant medication for treatment    Allergic 2015    Seasonal    Anxiety 2019    Formal diagnosis. Taking medication and weekly therapy as treatment    Depression 2019    Formally diagnosed. Weekly therapy as treatment    Hyperlipidemia 4/5/2024    Hypertension 2022    I suspect it is mostly anxiety related, not formally diagnosed, 142/90 last time checked in August    Obesity Unknown    Since childhood. Most of family is obese. Am currently addressing with Dietitian.       Past Surgical History: History reviewed. No pertinent surgical history.    Family History:   Family History   Problem Relation Age of Onset    Anxiety disorder Mother         I am willing to bet that the majority of my family has undiagnosed mental illness including anxiety, adhd, depression. They display all the symptoms but don't consider mental health and do not seek medical attention    Depression Mother     Drug abuse Mother         Cigarettes, casual pill-taker    Alcohol abuse Father         Father, several aunts and uncles, grandfather    Depression Father         Undiagnosed, has attempted suicide    Drug abuse Father         Cigarettes, painkillers    Heart attack Father         3 or 4 in lifetime. Smoking related    Alcohol abuse Paternal Grandfather     Depression Paternal Grandfather     Depression Brother         Two brothers have been diagnosed    Depression Brother     Hypertension  "Brother        Social History:   Social History     Socioeconomic History    Marital status:    Tobacco Use    Smoking status: Never     Passive exposure: Past    Smokeless tobacco: Never   Vaping Use    Vaping status: Never Used   Substance and Sexual Activity    Alcohol use: Yes     Alcohol/week: 2.0 standard drinks of alcohol     Types: 2 Cans of beer per week     Comment: Every other week, not weekly.    Drug use: Yes     Frequency: 10.0 times per week     Types: Marijuana     Comment: Only marijuana    Sexual activity: Yes     Partners: Female, Male     Birth control/protection: Condom, Birth control pill       Medications:     Current Outpatient Medications:     aspirin 81 MG EC tablet, Take 1 tablet by mouth Daily., Disp: , Rfl:     guanFACINE (TENEX) 2 MG tablet, Take 1 tablet every day by oral route., Disp: , Rfl:     Omega-3 Fatty Acids (fish oil) 1000 MG capsule capsule, Take 1 capsule by mouth Daily With Breakfast., Disp: , Rfl:     venlafaxine XR (EFFEXOR-XR) 150 MG 24 hr capsule, Take 1 capsule by mouth Daily., Disp: , Rfl:     vitamin B-12 (CYANOCOBALAMIN) 1000 MCG tablet, Take 1 tablet by mouth Daily., Disp: 90 tablet, Rfl: 1    albuterol sulfate  (90 Base) MCG/ACT inhaler, Inhale 1-2 puffs As Needed. every 4 to 6 hours, Disp: , Rfl:     ipratropium (ATROVENT) 0.06 % nasal spray, USE 1 TO 2 SPRAYS IN EACH NOSTRIL TWICE DAILY AS NEEDED, Disp: , Rfl:     Sulfacetamide Sodium-Sulfur 10-5 % liquid, USE ONE APPLICATION TOPICALLY EVERY DAY., Disp: , Rfl:     Allergies:   No Known Allergies    Objective     Vital Signs:   Vitals:    10/17/24 1524   BP: 117/82   Pulse: 84   Resp: 16   SpO2: 99%   Weight: 123 kg (272 lb)   Height: 165.1 cm (65\")   PainSc: 0-No pain    Body mass index is 45.26 kg/m².   Pain Score    10/17/24 1524   PainSc: 0-No pain       Physical Exam:   General: No acute distress. Well appearing   HEENT: Normocephalic, atraumatic. Sclera anicteric.   Neck: supple, no " adenopathy.   Cardiovascular: regular rate and rhythm. No murmurs.   Respiratory: Normal rate. Clear to auscultation bilaterally.  Abdomen: Soft, nontender, non distended with normoactive bowel sounds. No hepatosplenomegaly  Lymph: no cervical, supraclavicular or axillary adenopathy.  Neuro: Alert and oriented x 3. No focal deficits.   Ext: Symmetric, no swelling.   Psych: Euthymic      Laboratory/Imaging Reviewed:   No visits with results within 2 Week(s) from this visit.   Latest known visit with results is:   Lab on 09/23/2024   Component Date Value Ref Range Status    Hemoglobin A1C 09/23/2024 5.10  4.80 - 5.60 % Final    Protein C-Functional 09/23/2024 115  73 - 180 % Final    Specimen Status 09/23/2024 Comment   Final    Reference lab report sent via fax.  See scanned report      Interpretation 09/23/2024 Comment   Final    Comment: Results for CL are reported as phospholipid units based on  Damon' Antiphospholipid antibody standards: GPL for IgG  phospholipid units, MPL for IgM phospholipid units, APL for  IgA phospholipid units. Results for PI, PG, and PS are based  on normalized population data standardized to equivalent  phospholipid units (Approximately 3 multiples of the median  or 4 standard deviations).  ------------------------------------------------------------   GPL      MPL       APL     Result     Interpretation  ------------------------------------------------------------   <=10     <=10     <=15     Negative   No evidence for                                         antiphospholipid                                         antibodies   >10-<=20 10-<25   15-<=28  Borderline Not associated                                         with poor                                         pregnancy outcome                                         or manifestations.   >20-=<80 >=25-<80 >28-<=80 Positive   Can be                            associated                                         with recurrent                                          pregnancy loss,                                         lupus, and                                         thrombosis.   >80      >80      >80      High       Stronger association                              Positive   with thromboembolic                                         events / autoimmune                                         disease.  ------------------------------------------------------------     Component      Latest Ref Rng 9/23/2024   Protime      sec 10.3    INR      ratio 0.9    PTT      sec 26.7    APTT 1:1 NP      sec TNP    APTT 1:1 Saline      sec TNP    Thrombin Time      sec 17.1    DRVVT Screen Seconds      sec 46.3    DRVVT Confirm Seconds      sec TNP    DRVVT/Confirm Ratio      ratio TNP    Hex Phosph Neut Test      sec 6    Platelet Neutralization      sec 1.9    Anticardiolipin IgG      GPL <10    Anticardiolipin IgM      MPL <10    Beta-2 Glyco 1 IgG      SGU <10    Beta-2 Glyco 1 IgM      SMU <10    Beta-2 Glyco 1 IgA      DONALDO <10    LAC Interpretation Comment    Folate      4.78 - 24.20 ng/mL 9.44    Vitamin B-12      211 - 946 pg/mL 681    Protein S Ag, Total      60 - 150 % 56 (L)    Protein S Ag, Free      61 - 136 % 90    Specimen Status Comment    Interpretation Comment    Antithrombin Antigen      72 - 124 % 70 (L)    Protein C Antigen      60 - 150 % 92    Factor V Leiden      Normal  Heterozygous !    Hemoglobin A1C      4.80 - 5.60 % 5.10    Protein C-Functional      73 - 180 % 115         No results found.    Assessment / Plan      Assessment/Plan:   Factor V Leiden heterozygous  Mild reduction in protein S Ag with no activity level available  Mild reduction in Antithrombin level that does not meet the level anticipated for AT deficiency  4.  H/o recurrent superficial thrombophlebitis  We discussed the diagnosis and prognosis of factor V Leiden heterozygous which is associated with an increased risk for VTE, on the order of 5-10% over her  lifetime. She has not had a VTE event, although she has experience 2 episodes of provoked thrombophlebitis while on OCPs.   She is s/p BTL and has discontinued OCPs. I recommend avoiding OCPs (and HRT after menopause). While there is no high quality evidence to support antiplatelet agents, a daily ASA 81 mg EC is reasonable if she can tolerate it, particularly during situations such as long distance travel. She prefers to be proactive and plans to continue ASA 81 mg.     She should receive prophylactic anticoagulation while hospitalized, following high risk surgeries such as orthopedic and intraabdominal surgery, and during prolonged periods of immobility such as following orthopedic procedures/injuries.     -I am going to obtain protein S activity levels and prothrombin gene mutation testing to rule out combined deficiency.     Orders Placed This Encounter   Procedures    Antithrombin III    Protein S Functional    Factor II, DNA Analysis    Protein S, Total & Free       Follow Up:   1 mo     Soraida Garza MD  Hematology and Oncology

## 2024-10-24 ENCOUNTER — LAB (OUTPATIENT)
Dept: INTERNAL MEDICINE | Facility: CLINIC | Age: 29
End: 2024-10-24
Payer: COMMERCIAL

## 2024-10-24 DIAGNOSIS — D68.51 HETEROZYGOUS FACTOR V LEIDEN MUTATION: ICD-10-CM

## 2024-10-24 PROCEDURE — 85300 ANTITHROMBIN III ACTIVITY: CPT | Performed by: INTERNAL MEDICINE

## 2024-10-24 PROCEDURE — 85305 CLOT INHIBIT PROT S TOTAL: CPT | Performed by: INTERNAL MEDICINE

## 2024-10-24 PROCEDURE — 36415 COLL VENOUS BLD VENIPUNCTURE: CPT | Performed by: PHYSICIAN ASSISTANT

## 2024-10-24 PROCEDURE — 81240 F2 GENE: CPT | Performed by: INTERNAL MEDICINE

## 2024-10-24 PROCEDURE — 85306 CLOT INHIBIT PROT S FREE: CPT | Performed by: INTERNAL MEDICINE

## 2024-10-25 LAB — FACTOR II, DNA ANALYSIS: NORMAL

## 2024-10-27 LAB
AT III ACT/NOR PPP CHRO: 101 % (ref 75–135)
PROT S ACT/NOR PPP: 75 % (ref 63–140)
PROT S AG ACT/NOR PPP IA: 76 % (ref 60–150)
PROT S FREE AG ACT/NOR PPP IA: 94 % (ref 61–136)

## 2024-11-04 ENCOUNTER — OFFICE VISIT (OUTPATIENT)
Dept: INTERNAL MEDICINE | Facility: CLINIC | Age: 29
End: 2024-11-04
Payer: COMMERCIAL

## 2024-11-04 VITALS
HEIGHT: 65 IN | HEART RATE: 84 BPM | BODY MASS INDEX: 45.25 KG/M2 | WEIGHT: 271.6 LBS | SYSTOLIC BLOOD PRESSURE: 132 MMHG | DIASTOLIC BLOOD PRESSURE: 84 MMHG | OXYGEN SATURATION: 99 % | TEMPERATURE: 96.9 F

## 2024-11-04 DIAGNOSIS — D68.51 FACTOR V LEIDEN: ICD-10-CM

## 2024-11-04 DIAGNOSIS — F90.9 ATTENTION DEFICIT HYPERACTIVITY DISORDER (ADHD), UNSPECIFIED ADHD TYPE: ICD-10-CM

## 2024-11-04 DIAGNOSIS — F41.1 GAD (GENERALIZED ANXIETY DISORDER): Primary | ICD-10-CM

## 2024-11-04 DIAGNOSIS — J45.20 MILD INTERMITTENT ASTHMA WITHOUT COMPLICATION: ICD-10-CM

## 2024-11-04 PROCEDURE — 99214 OFFICE O/P EST MOD 30 MIN: CPT | Performed by: PHYSICIAN ASSISTANT

## 2024-11-04 NOTE — PROGRESS NOTES
[facility]  [address]  [place] [address]  Dept: [phone]  Dept Fax: [phone]  Loc: [phone]  Loc Fax: [phone]    Tigist Ramos  1995    Follow Up Office Visit Note    History of Present Illness:  Patient is a 28-year-old female in today to follow-up for asthma, anxiety, and ADHD.  On guanfacine as directed for ADHD without any problems or side effects.  Reports good symptomatic control.     On Effexor for anxiety.  Reports good symptomatic control.     Uses albuterol inhaler for asthma as directed.     Patient overall doing well and feeling well.          The following portions of the patient's history were reviewed and updated as appropriate: allergies, current medications, past family history, past medical history, past social history, past surgical history, and problem list.    Medications:    Current Outpatient Medications:     albuterol sulfate  (90 Base) MCG/ACT inhaler, Inhale 1-2 puffs As Needed. every 4 to 6 hours, Disp: , Rfl:     aspirin 81 MG EC tablet, Take 1 tablet by mouth Daily., Disp: , Rfl:     guanFACINE (TENEX) 2 MG tablet, Take 1 tablet every day by oral route., Disp: , Rfl:     ipratropium (ATROVENT) 0.06 % nasal spray, USE 1 TO 2 SPRAYS IN EACH NOSTRIL TWICE DAILY AS NEEDED, Disp: , Rfl:     Omega-3 Fatty Acids (fish oil) 1000 MG capsule capsule, Take 1 capsule by mouth Daily With Breakfast., Disp: , Rfl:     Sulfacetamide Sodium-Sulfur 10-5 % liquid, USE ONE APPLICATION TOPICALLY EVERY DAY., Disp: , Rfl:     venlafaxine XR (EFFEXOR-XR) 150 MG 24 hr capsule, Take 1 capsule by mouth Daily., Disp: , Rfl:     vitamin B-12 (CYANOCOBALAMIN) 1000 MCG tablet, Take 1 tablet by mouth Daily., Disp: 90 tablet, Rfl: 1    Subjective  No Known Allergies     Past Medical History:   Diagnosis Date    ADHD (attention deficit hyperactivity disorder) 2021 or 2022    Have not been formally diagnosed, but have  been screened twice and psychiatrist agrees. Taking non stimulant medication for treatment    Allergic 2015    Seasonal    Anxiety 2019    Formal diagnosis. Taking medication and weekly therapy as treatment    Depression 2019    Formally diagnosed. Weekly therapy as treatment    Hyperlipidemia 4/5/2024    Hypertension 2022    I suspect it is mostly anxiety related, not formally diagnosed, 142/90 last time checked in August    Obesity Unknown    Since childhood. Most of family is obese. Am currently addressing with Dietitian.       History reviewed. No pertinent surgical history.    Family History   Problem Relation Age of Onset    Anxiety disorder Mother         I am willing to bet that the majority of my family has undiagnosed mental illness including anxiety, adhd, depression. They display all the symptoms but don't consider mental health and do not seek medical attention    Depression Mother     Drug abuse Mother         Cigarettes, casual pill-taker    Alcohol abuse Father         Father, several aunts and uncles, grandfather    Depression Father         Undiagnosed, has attempted suicide    Drug abuse Father         Cigarettes, painkillers    Heart attack Father         3 or 4 in lifetime. Smoking related    Alcohol abuse Paternal Grandfather     Depression Paternal Grandfather     Depression Brother         Two brothers have been diagnosed    Depression Brother     Hypertension Brother         Social History     Socioeconomic History    Marital status:    Tobacco Use    Smoking status: Never     Passive exposure: Past    Smokeless tobacco: Never   Vaping Use    Vaping status: Never Used   Substance and Sexual Activity    Alcohol use: Yes     Alcohol/week: 2.0 standard drinks of alcohol     Types: 2 Cans of beer per week     Comment: Every other week, not weekly.    Drug use: Yes     Frequency: 10.0 times per week     Types: Marijuana     Comment: Only marijuana    Sexual activity: Yes     Partners:  "Female, Male     Birth control/protection: Condom, Birth control pill       Review of Systems   Constitutional:  Negative for activity change, chills, fatigue, fever and unexpected weight change.   HENT:  Negative for congestion, ear pain, postnasal drip, sinus pressure and sore throat.    Eyes:  Negative for pain, discharge and redness.   Respiratory:  Negative for cough, shortness of breath and wheezing.    Cardiovascular:  Negative for chest pain, palpitations and leg swelling.   Gastrointestinal:  Negative for diarrhea, nausea and vomiting.   Endocrine: Negative for cold intolerance and heat intolerance.   Genitourinary:  Negative for decreased urine volume and dysuria.   Musculoskeletal:  Negative for arthralgias and myalgias.   Skin:  Negative for rash and wound.   Neurological:  Negative for dizziness, light-headedness and headaches.   Hematological:  Does not bruise/bleed easily.   Psychiatric/Behavioral:  Negative for confusion, dysphoric mood and sleep disturbance. The patient is not nervous/anxious.          Objective  Vitals:    11/04/24 1548   BP: 132/84   BP Location: Left arm   Patient Position: Sitting   Cuff Size: Large Adult   Pulse: 84   Temp: 96.9 °F (36.1 °C)   TempSrc: Temporal   SpO2: 99%   Weight: 123 kg (271 lb 9.6 oz)   Height: 165.1 cm (65\")     Body mass index is 45.2 kg/m².     Physical Exam  Physical Exam  Vitals and nursing note reviewed.   Constitutional:       General: She is not in acute distress.     Appearance: She is not ill-appearing.   HENT:      Head: Normocephalic.      Right Ear: Tympanic membrane, ear canal and external ear normal. There is no impacted cerumen.      Left Ear: Tympanic membrane, ear canal and external ear normal. There is no impacted cerumen.      Nose: No congestion or rhinorrhea.      Mouth/Throat:      Mouth: Mucous membranes are moist.      Pharynx: Oropharynx is clear. No oropharyngeal exudate or posterior oropharyngeal erythema.   Eyes:      General:   "       Right eye: No discharge.         Left eye: No discharge.      Extraocular Movements: Extraocular movements intact.      Conjunctiva/sclera: Conjunctivae normal.      Pupils: Pupils are equal, round, and reactive to light.   Cardiovascular:      Rate and Rhythm: Normal rate and regular rhythm.      Heart sounds: Normal heart sounds. No murmur heard.     No friction rub. No gallop.   Pulmonary:      Effort: Pulmonary effort is normal. No respiratory distress.      Breath sounds: Normal breath sounds. No wheezing.   Abdominal:      General: Bowel sounds are normal. There is no distension.      Palpations: Abdomen is soft. There is no mass.      Tenderness: There is no abdominal tenderness.   Musculoskeletal:         General: No swelling. Normal range of motion.      Cervical back: Normal range of motion. No tenderness.      Right lower leg: No edema.      Left lower leg: No edema.   Lymphadenopathy:      Cervical: No cervical adenopathy.   Skin:     Findings: No bruising, erythema or rash.   Neurological:      Mental Status: She is oriented to person, place, and time.      Gait: Gait normal.   Psychiatric:         Mood and Affect: Mood normal.         Behavior: Behavior normal.         Thought Content: Thought content normal.         Judgment: Judgment normal.         Diagnostic Data  Procedures    Assessment  Diagnoses and all orders for this visit:    1. CAITLIN (generalized anxiety disorder) (Primary)    2. Mild intermittent asthma without complication    3. Attention deficit hyperactivity disorder (ADHD), unspecified ADHD type    4. Factor V Leiden        Plan    1. CAITLIN (generalized anxiety disorder) (Primary)- well-controlled on Effexor.  Keep same.  Continue to monitor.     2. Mild intermittent asthma without complication- stable on albuterol inhaler.     3. Attention deficit hyperactivity disorder (ADHD), unspecified ADHD type- well-controlled on guanfacine.  Continue to follow with psych.    4. Factor V- cont  to foll with hem/onc.      Return in about 3 months (around 2/4/2025) for Recheck.    Emil Miller PA-C  11/04/2024

## 2024-11-08 RX ORDER — KETOCONAZOLE 20 MG/ML
SHAMPOO TOPICAL WEEKLY
COMMUNITY

## 2024-11-08 RX ORDER — IBUPROFEN 600 MG/1
600 TABLET, FILM COATED ORAL EVERY 6 HOURS PRN
COMMUNITY

## 2024-11-08 RX ORDER — ONDANSETRON 4 MG/1
4 TABLET, ORALLY DISINTEGRATING ORAL EVERY 8 HOURS PRN
COMMUNITY
End: 2024-11-11

## 2024-11-08 RX ORDER — CLONIDINE HYDROCHLORIDE 0.1 MG/1
1 TABLET ORAL 2 TIMES DAILY PRN
COMMUNITY

## 2024-11-11 ENCOUNTER — LAB (OUTPATIENT)
Dept: LAB | Facility: HOSPITAL | Age: 29
End: 2024-11-11
Payer: COMMERCIAL

## 2024-11-11 ENCOUNTER — OFFICE VISIT (OUTPATIENT)
Dept: NEUROLOGY | Facility: CLINIC | Age: 29
End: 2024-11-11
Payer: COMMERCIAL

## 2024-11-11 VITALS
OXYGEN SATURATION: 98 % | HEART RATE: 88 BPM | HEIGHT: 65 IN | BODY MASS INDEX: 45.42 KG/M2 | SYSTOLIC BLOOD PRESSURE: 124 MMHG | DIASTOLIC BLOOD PRESSURE: 88 MMHG | WEIGHT: 272.6 LBS

## 2024-11-11 DIAGNOSIS — R20.0 NUMBNESS AND TINGLING: Primary | ICD-10-CM

## 2024-11-11 DIAGNOSIS — F40.240 CLAUSTROPHOBIA: ICD-10-CM

## 2024-11-11 DIAGNOSIS — F95.2 TOURETTE SYNDROME: ICD-10-CM

## 2024-11-11 DIAGNOSIS — R20.0 NUMBNESS AND TINGLING: ICD-10-CM

## 2024-11-11 DIAGNOSIS — R20.2 NUMBNESS AND TINGLING: ICD-10-CM

## 2024-11-11 DIAGNOSIS — R20.2 NUMBNESS AND TINGLING: Primary | ICD-10-CM

## 2024-11-11 PROBLEM — E28.2 POLYCYSTIC OVARIAN SYNDROME: Status: ACTIVE | Noted: 2018-11-01

## 2024-11-11 LAB
CK SERPL-CCNC: 171 U/L (ref 20–180)
CRP SERPL-MCNC: <0.3 MG/DL (ref 0–0.5)

## 2024-11-11 PROCEDURE — 82300 ASSAY OF CADMIUM: CPT

## 2024-11-11 PROCEDURE — 86334 IMMUNOFIX E-PHORESIS SERUM: CPT

## 2024-11-11 PROCEDURE — 82570 ASSAY OF URINE CREATININE: CPT

## 2024-11-11 PROCEDURE — 82175 ASSAY OF ARSENIC: CPT

## 2024-11-11 PROCEDURE — 83655 ASSAY OF LEAD: CPT

## 2024-11-11 PROCEDURE — 84165 PROTEIN E-PHORESIS SERUM: CPT

## 2024-11-11 PROCEDURE — 82595 ASSAY OF CRYOGLOBULIN: CPT

## 2024-11-11 PROCEDURE — 86617 LYME DISEASE ANTIBODY: CPT

## 2024-11-11 PROCEDURE — 86235 NUCLEAR ANTIGEN ANTIBODY: CPT

## 2024-11-11 PROCEDURE — 84155 ASSAY OF PROTEIN SERUM: CPT

## 2024-11-11 PROCEDURE — 83825 ASSAY OF MERCURY: CPT

## 2024-11-11 PROCEDURE — 86140 C-REACTIVE PROTEIN: CPT

## 2024-11-11 PROCEDURE — 82550 ASSAY OF CK (CPK): CPT

## 2024-11-11 PROCEDURE — 99214 OFFICE O/P EST MOD 30 MIN: CPT | Performed by: NURSE PRACTITIONER

## 2024-11-11 PROCEDURE — 86200 CCP ANTIBODY: CPT

## 2024-11-11 PROCEDURE — 82784 ASSAY IGA/IGD/IGG/IGM EACH: CPT

## 2024-11-11 PROCEDURE — 86431 RHEUMATOID FACTOR QUANT: CPT

## 2024-11-11 PROCEDURE — 36415 COLL VENOUS BLD VENIPUNCTURE: CPT

## 2024-11-11 RX ORDER — DIAZEPAM 5 MG/1
TABLET ORAL
Qty: 2 TABLET | Refills: 0 | Status: SHIPPED | OUTPATIENT
Start: 2024-11-11

## 2024-11-11 NOTE — LETTER
2024     Emil Miller PA-C  [address] [facility]  [address]  [place] [id number]    Patient: Tigist Ramos   YOB: 1995   Date of Visit: 2024     Dear Emil Miller PA-C:       Thank you for referring Tigist Ramos to me for evaluation. Below are the relevant portions of my assessment and plan of care.    If you have questions, please do not hesitate to call me. I look forward to following Tigist along with you.         Sincerely,        Mei Sánchez DNP, APRN        CC: No Recipients    Mei Sánchez DNP, APRN  [phone]  Signed     Neuro Office Visit      Encounter Date: 2024   Patient Name: Tigist Ramos  : 1995   MRN: [id number]   PCP: LAUREN Addison  Chief Complaint:    Chief Complaint   Patient presents with   • Numbness       History of Present Illness: Tigist Ramos is a 28 y.o. female who is here today in Neurology for tics and  numbness and tingling.    History of Present Illness  The patient presents for evaluation of numbness and tingling.    Tic  She experiences an auditory tic, which began when she was 23 or 24 years old. Initially, it was associated with cold chills up her spine, but it has since become more frequent and intense, sometimes occurring 10 to 15 times in a row. The tic is more pronounced when she is tired or cold. She has seen a psychiatrist for ADHD and anxiety, who suggested that her tic may be related to anxiety.    Paresthesia  She reports experiencing numbness and tingling in her hands and feet, which started in . This sensation, described as paresthesia, is more frequent in her hands and often occurs upon waking. Despite this, she does not report any weakness in her hands or feet. The numbness and tingling are more frequent and extends to the wrist and towards the middle of her feet from the toes.     She has a history of vitamin B12 deficiency and takes supplements for this.  She has sprained her ankle multiple times. She is not aware of any exposure to toxins such as mercury, lead, or arsenic, and reports no history of radiation or chemotherapy. She also reports no infections such as hepatitis or HIV.    She has noticed a slight deterioration in one eye, with occasional blurring of words when reading. She reports no loss of vision, double vision, slurred speech, or difficulty swallowing. She also reports no bladder or bowel issues. She has a weak pelvic floor and has undergone physical therapy for this. She reports no falls and maintains a steady gait. She does not experience any sensation of being squeezed or feeling a lightning bolt when lowering her chin to her chest. She experiences constant itchiness on her back, particularly in the upper center area, which she describes as patchy.    She tested positive for factor V Leiden and is seeing a hematologist to assess her increased risk for blood clotting. She has stopped taking birth control due to this. She exercises at the gym four times a week and takes aspirin daily. She is trying to quit smoking.    She has a history of alopecia and takes fish oil supplements. She also has PCOS.               EMG & Nerve Conduction Test (06/12/2024 13:35)mild senosory peripheral neuropathy.      Marijuana Use  Vape pen 1-2 times a day and edible.      PMH: Leiden Factor V, HTN, anxiety, HLD, asthma, pcos, adhd, vit b12 def.  FH: anxiety, depression, drug abuse, etoh, htn  SH:, -tob, +etoh, +THC. Massage therapist.  Subjective      Past Medical History:   Past Medical History:   Diagnosis Date   • ADHD (attention deficit hyperactivity disorder) 2021 or 2022    Have not been formally diagnosed, but have been screened twice and psychiatrist agrees. Taking non stimulant medication for treatment   • Allergic 2015    Seasonal   • Anxiety 2019    Formal diagnosis. Taking medication and weekly therapy as treatment   • Depression 2019    Formally  diagnosed. Weekly therapy as treatment   • Hyperlipidemia 4/5/2024   • Hypertension 2022    I suspect it is mostly anxiety related, not formally diagnosed, 142/90 last time checked in August   • Obesity Unknown    Since childhood. Most of family is obese. Am currently addressing with Dietitian.       Past Surgical History: History reviewed. No pertinent surgical history.    Family History:   Family History   Problem Relation Age of Onset   • Anxiety disorder Mother         I am willing to bet that the majority of my family has undiagnosed mental illness including anxiety, adhd, depression. They display all the symptoms but don't consider mental health and do not seek medical attention   • Depression Mother    • Drug abuse Mother         Cigarettes, casual pill-taker   • Alcohol abuse Father         Father, several aunts and uncles, grandfather   • Depression Father         Undiagnosed, has attempted suicide   • Drug abuse Father         Cigarettes, painkillers   • Heart attack Father         3 or 4 in lifetime. Smoking related   • Alcohol abuse Paternal Grandfather    • Depression Paternal Grandfather    • Depression Brother         Two brothers have been diagnosed   • Depression Brother    • Hypertension Brother        Social History:   Social History     Socioeconomic History   • Marital status:    Tobacco Use   • Smoking status: Never     Passive exposure: Past   • Smokeless tobacco: Never   Vaping Use   • Vaping status: Never Used   Substance and Sexual Activity   • Alcohol use: Yes     Alcohol/week: 2.0 standard drinks of alcohol     Types: 2 Cans of beer per week     Comment: Every other week, not weekly.   • Drug use: Yes     Frequency: 10.0 times per week     Types: Marijuana     Comment: Only marijuana   • Sexual activity: Yes     Partners: Female, Male     Birth control/protection: Condom, Birth control pill       Medications:     Current Outpatient Medications:   •  albuterol sulfate  (90  Base) MCG/ACT inhaler, Inhale 1-2 puffs As Needed. every 4 to 6 hours, Disp: , Rfl:   •  aspirin 81 MG EC tablet, Take 1 tablet by mouth Daily., Disp: , Rfl:   •  cloNIDine (CATAPRES) 0.1 MG tablet, Take 1 tablet by mouth 2 (Two) Times a Day As Needed., Disp: , Rfl:   •  guanFACINE (TENEX) 2 MG tablet, Take 1 tablet every day by oral route., Disp: , Rfl:   •  ibuprofen (ADVIL,MOTRIN) 600 MG tablet, Take 1 tablet by mouth Every 6 (Six) Hours As Needed., Disp: , Rfl:   •  ipratropium (ATROVENT) 0.06 % nasal spray, USE 1 TO 2 SPRAYS IN EACH NOSTRIL TWICE DAILY AS NEEDED, Disp: , Rfl:   •  ketoconazole (NIZORAL) 2 % shampoo, Apply  topically to the appropriate area as directed 1 (One) Time Per Week., Disp: , Rfl:   •  Omega-3 Fatty Acids (fish oil) 1000 MG capsule capsule, Take 1 capsule by mouth Daily With Breakfast., Disp: , Rfl:   •  venlafaxine XR (EFFEXOR-XR) 150 MG 24 hr capsule, Take 1 capsule by mouth Daily., Disp: , Rfl:   •  vitamin B-12 (CYANOCOBALAMIN) 1000 MCG tablet, Take 1 tablet by mouth Daily., Disp: 90 tablet, Rfl: 1  •  diazePAM (Valium) 5 MG tablet, TAke 30 min prior to MRI and may repeat x1, Disp: 2 tablet, Rfl: 0    Allergies:   No Known Allergies    PHQ-9 Total Score:     STEADI Fall Risk Assessment has not been completed.    Objective     Physical Exam:   Physical Exam  Eyes:      General: Lids are normal.      Extraocular Movements: EOM normal. No nystagmus.   Neurological:      Coordination: Romberg sign negative.      Deep Tendon Reflexes:      Reflex Scores:       Bicep reflexes are 2+ on the right side and 2+ on the left side.       Brachioradialis reflexes are 2+ on the right side and 2+ on the left side.       Patellar reflexes are 2+ on the right side and 2+ on the left side.       Achilles reflexes are 2+ on the right side and 2+ on the left side.  Psychiatric:         Speech: Speech normal.         Neurological Exam  Mental Status  Awake, alert and oriented to person, place and time.  Recent and remote memory are intact. Speech is normal. Follows three-step commands. Attention and concentration are normal. Fund of knowledge is appropriate for level of education.  Pt demonstrates intermittent high pitched squeak with full body contraction. No LOC..    Cranial Nerves  CN II: Right visual acuity: Finger movement. Left visual acuity: Finger movement. Right normal visual field. Left normal visual field.  CN III, IV, VI: Extraocular movements intact bilaterally. No nystagmus. Normal saccades. Normal lids and orbits bilaterally.   Right pupil: Round.   Left pupil: Round.  CN V: Facial sensation is normal.  CN VII: Full and symmetric facial movement.  CN IX, X: Palate elevates symmetrically  CN XI: Shoulder shrug strength is normal.  CN XII: Tongue midline without atrophy or fasciculations.    Motor  Normal muscle bulk throughout. No fasciculations present. Normal muscle tone. No abnormal involuntary movements. Strength is 5/5 in all four extremities except as noted. No pronator drift.    Sensory  Sensation is intact to light touch, pinprick, vibration and proprioception in all four extremities.    Reflexes                                            Right                      Left  Brachioradialis                    2+                         2+  Biceps                                 2+                         2+  Patellar                                2+                         2+  Achilles                                2+                         2+    Right pathological reflexes: Ankle clonus absent.  Left pathological reflexes: Ankle clonus absent.    Coordination  Right: Finger-to-nose normal. Rapid alternating movement normal. Heel-to-shin normal.    Gait  Normal casual, toe, heel and tandem gait. Romberg is absent. Able to rise from chair without using arms.     Physical Exam  Vital Signs  Blood pressure is 124/88. Heart rate is 88.      Vital Signs:   Vitals:    11/11/24 1349   BP: 124/88  "  Pulse: 88   SpO2: 98%   Weight: 124 kg (272 lb 9.6 oz)   Height: 165.1 cm (65\")     Body mass index is 45.36 kg/m².         Assessment / Plan      Assessment/Plan:   Diagnoses and all orders for this visit:    1. Numbness and tingling (Primary)  -     CK; Future  -     C-reactive Protein; Future  -     Cryoglobulin; Future  -     Heavy Metals Profile II, Urine - Urine, Clean Catch; Future  -     LOUIS + PE; Future  -     Lyme Disease, Line Blot; Future  -     Rheumatoid Arthritis (RA) Profile; Future  -     Sjogren's Antibody, Anti-SS-A / -SS-B; Future  -     MRI Brain With & Without Contrast; Future    2. Claustrophobia  -     diazePAM (Valium) 5 MG tablet; TAke 30 min prior to MRI and may repeat x1  Dispense: 2 tablet; Refill: 0    3. Tourette syndrome  Comments:  FU with psych       Assessment & Plan  1. Numbness and tingling.  Her EMG from June 2024 indicated mild sensory peripheral neuropathy, with no muscle involvement. No neurological deficits were observed during the examination. A comprehensive set of tests will be conducted, including muscle breakdown, CRP, cryoglobulins, heavy metals urine, and immune labs. An MRI of the brain with and without contrast will be ordered. Valium will be prescribed to be taken 30 minutes prior to the MRI, with a second dose available if necessary. She is advised to have a  for the MRI appointment.    2. Auditory tic.  The tic is more frequent when tired, cold, or under the influence of alcohol. She has been advised that the tic may be related to anxiety. No immediate medication changes are recommended until further testing is completed.    3. Factor V Leiden.  She is currently seeing a hematologist to assess the increased risk for blood clotting. She has been taken off birth control due to this condition and is taking aspirin daily. She is advised to continue her current regimen and follow up with her hematologist.    4. Vitamin B12 deficiency.  She is taking vitamin " B12 supplements. Her levels have improved to 681. No changes to the current treatment plan are necessary.    5. Anxiety.  She is currently on Effexor. No changes to her medication are recommended at this time. Further evaluation will be conducted based on the results of the upcoming tests.          Patient Education:       Reviewed medications, potential side effects and signs and symptoms to report. Discussed risk versus benefits of treatment plan with patient and/or family-including medications, labs and radiology that may be ordered. Addressed questions and concerns during visit. Patient and/or family verbalized understanding and agree with plan. Instructed to call the office with any questions and report to ER with any life-threatening symptoms.     Follow Up:   Return in about 3 months (around 2/11/2025) for Recheck.    During this visit the following were done:  Labs Reviewed [x]    Labs Ordered [x]    Radiology Reports Reviewed [x]    Radiology Ordered [x]    PCP Records Reviewed []    Referring Provider Records Reviewed []    ER Records Reviewed []    Hospital Records Reviewed []    History Obtained From Family []    Radiology Images Reviewed []    Other Reviewed [x]    Records Requested []      Patient or patient representative verbalized consent for the use of Ambient Listening during the visit with  Mei Sánchez DNP, PURA for chart documentation. 11/11/2024  12:42 EST      Mie Sánchez DNP, APRN

## 2024-11-11 NOTE — PROGRESS NOTES
Neuro Office Visit      Encounter Date: 2024   Patient Name: Tigist Ramos  : 1995   MRN: [id number]   PCP: LAUREN Addison  Chief Complaint:    Chief Complaint   Patient presents with    Numbness       History of Present Illness: Tigist Ramos is a 28 y.o. female who is here today in Neurology for tics and  numbness and tingling.    History of Present Illness  The patient presents for evaluation of numbness and tingling.    Tic  She experiences an auditory tic, which began when she was 23 or 24 years old. Initially, it was associated with cold chills up her spine, but it has since become more frequent and intense, sometimes occurring 10 to 15 times in a row. The tic is more pronounced when she is tired or cold. She has seen a psychiatrist for ADHD and anxiety, who suggested that her tic may be related to anxiety.    Paresthesia  She reports experiencing numbness and tingling in her hands and feet, which started in . This sensation, described as paresthesia, is more frequent in her hands and often occurs upon waking. Despite this, she does not report any weakness in her hands or feet. The numbness and tingling are more frequent and extends to the wrist and towards the middle of her feet from the toes.     She has a history of vitamin B12 deficiency and takes supplements for this. She has sprained her ankle multiple times. She is not aware of any exposure to toxins such as mercury, lead, or arsenic, and reports no history of radiation or chemotherapy. She also reports no infections such as hepatitis or HIV.    She has noticed a slight deterioration in one eye, with occasional blurring of words when reading. She reports no loss of vision, double vision, slurred speech, or difficulty swallowing. She also reports no bladder or bowel issues. She has a weak pelvic floor and has undergone physical therapy for this. She reports no falls and maintains a steady gait. She does not experience  any sensation of being squeezed or feeling a lightning bolt when lowering her chin to her chest. She experiences constant itchiness on her back, particularly in the upper center area, which she describes as patchy.    She tested positive for factor V Leiden and is seeing a hematologist to assess her increased risk for blood clotting. She has stopped taking birth control due to this. She exercises at the gym four times a week and takes aspirin daily. She is trying to quit smoking.    She has a history of alopecia and takes fish oil supplements. She also has PCOS.               EMG & Nerve Conduction Test (06/12/2024 13:35)mild senosory peripheral neuropathy.      Marijuana Use  Vape pen 1-2 times a day and edible.      PMH: Leiden Factor V, HTN, anxiety, HLD, asthma, pcos, adhd, vit b12 def.  FH: anxiety, depression, drug abuse, etoh, htn  SH:, -tob, +etoh, +THC. Massage therapist.  Subjective      Past Medical History:   Past Medical History:   Diagnosis Date    ADHD (attention deficit hyperactivity disorder) 2021 or 2022    Have not been formally diagnosed, but have been screened twice and psychiatrist agrees. Taking non stimulant medication for treatment    Allergic 2015    Seasonal    Anxiety 2019    Formal diagnosis. Taking medication and weekly therapy as treatment    Depression 2019    Formally diagnosed. Weekly therapy as treatment    Hyperlipidemia 4/5/2024    Hypertension 2022    I suspect it is mostly anxiety related, not formally diagnosed, 142/90 last time checked in August    Obesity Unknown    Since childhood. Most of family is obese. Am currently addressing with Dietitian.       Past Surgical History: History reviewed. No pertinent surgical history.    Family History:   Family History   Problem Relation Age of Onset    Anxiety disorder Mother         I am willing to bet that the majority of my family has undiagnosed mental illness including anxiety, adhd, depression. They display all the  symptoms but don't consider mental health and do not seek medical attention    Depression Mother     Drug abuse Mother         Cigarettes, casual pill-taker    Alcohol abuse Father         Father, several aunts and uncles, grandfather    Depression Father         Undiagnosed, has attempted suicide    Drug abuse Father         Cigarettes, painkillers    Heart attack Father         3 or 4 in lifetime. Smoking related    Alcohol abuse Paternal Grandfather     Depression Paternal Grandfather     Depression Brother         Two brothers have been diagnosed    Depression Brother     Hypertension Brother        Social History:   Social History     Socioeconomic History    Marital status:    Tobacco Use    Smoking status: Never     Passive exposure: Past    Smokeless tobacco: Never   Vaping Use    Vaping status: Never Used   Substance and Sexual Activity    Alcohol use: Yes     Alcohol/week: 2.0 standard drinks of alcohol     Types: 2 Cans of beer per week     Comment: Every other week, not weekly.    Drug use: Yes     Frequency: 10.0 times per week     Types: Marijuana     Comment: Only marijuana    Sexual activity: Yes     Partners: Female, Male     Birth control/protection: Condom, Birth control pill       Medications:     Current Outpatient Medications:     albuterol sulfate  (90 Base) MCG/ACT inhaler, Inhale 1-2 puffs As Needed. every 4 to 6 hours, Disp: , Rfl:     aspirin 81 MG EC tablet, Take 1 tablet by mouth Daily., Disp: , Rfl:     cloNIDine (CATAPRES) 0.1 MG tablet, Take 1 tablet by mouth 2 (Two) Times a Day As Needed., Disp: , Rfl:     guanFACINE (TENEX) 2 MG tablet, Take 1 tablet every day by oral route., Disp: , Rfl:     ibuprofen (ADVIL,MOTRIN) 600 MG tablet, Take 1 tablet by mouth Every 6 (Six) Hours As Needed., Disp: , Rfl:     ipratropium (ATROVENT) 0.06 % nasal spray, USE 1 TO 2 SPRAYS IN EACH NOSTRIL TWICE DAILY AS NEEDED, Disp: , Rfl:     ketoconazole (NIZORAL) 2 % shampoo, Apply  topically  to the appropriate area as directed 1 (One) Time Per Week., Disp: , Rfl:     Omega-3 Fatty Acids (fish oil) 1000 MG capsule capsule, Take 1 capsule by mouth Daily With Breakfast., Disp: , Rfl:     venlafaxine XR (EFFEXOR-XR) 150 MG 24 hr capsule, Take 1 capsule by mouth Daily., Disp: , Rfl:     vitamin B-12 (CYANOCOBALAMIN) 1000 MCG tablet, Take 1 tablet by mouth Daily., Disp: 90 tablet, Rfl: 1    diazePAM (Valium) 5 MG tablet, TAke 30 min prior to MRI and may repeat x1, Disp: 2 tablet, Rfl: 0    Allergies:   No Known Allergies    PHQ-9 Total Score:     STEADI Fall Risk Assessment has not been completed.    Objective     Physical Exam:   Physical Exam  Eyes:      General: Lids are normal.      Extraocular Movements: EOM normal. No nystagmus.   Neurological:      Coordination: Romberg sign negative.      Deep Tendon Reflexes:      Reflex Scores:       Bicep reflexes are 2+ on the right side and 2+ on the left side.       Brachioradialis reflexes are 2+ on the right side and 2+ on the left side.       Patellar reflexes are 2+ on the right side and 2+ on the left side.       Achilles reflexes are 2+ on the right side and 2+ on the left side.  Psychiatric:         Speech: Speech normal.         Neurological Exam  Mental Status  Awake, alert and oriented to person, place and time. Recent and remote memory are intact. Speech is normal. Follows three-step commands. Attention and concentration are normal. Fund of knowledge is appropriate for level of education.  Pt demonstrates intermittent high pitched squeak with full body contraction. No LOC..    Cranial Nerves  CN II: Right visual acuity: Finger movement. Left visual acuity: Finger movement. Right normal visual field. Left normal visual field.  CN III, IV, VI: Extraocular movements intact bilaterally. No nystagmus. Normal saccades. Normal lids and orbits bilaterally.   Right pupil: Round.   Left pupil: Round.  CN V: Facial sensation is normal.  CN VII: Full and  "symmetric facial movement.  CN IX, X: Palate elevates symmetrically  CN XI: Shoulder shrug strength is normal.  CN XII: Tongue midline without atrophy or fasciculations.    Motor  Normal muscle bulk throughout. No fasciculations present. Normal muscle tone. No abnormal involuntary movements. Strength is 5/5 in all four extremities except as noted. No pronator drift.    Sensory  Sensation is intact to light touch, pinprick, vibration and proprioception in all four extremities.    Reflexes                                            Right                      Left  Brachioradialis                    2+                         2+  Biceps                                 2+                         2+  Patellar                                2+                         2+  Achilles                                2+                         2+    Right pathological reflexes: Ankle clonus absent.  Left pathological reflexes: Ankle clonus absent.    Coordination  Right: Finger-to-nose normal. Rapid alternating movement normal. Heel-to-shin normal.    Gait  Normal casual, toe, heel and tandem gait. Romberg is absent. Able to rise from chair without using arms.     Physical Exam  Vital Signs  Blood pressure is 124/88. Heart rate is 88.      Vital Signs:   Vitals:    11/11/24 1349   BP: 124/88   Pulse: 88   SpO2: 98%   Weight: 124 kg (272 lb 9.6 oz)   Height: 165.1 cm (65\")     Body mass index is 45.36 kg/m².         Assessment / Plan      Assessment/Plan:   Diagnoses and all orders for this visit:    1. Numbness and tingling (Primary)  -     CK; Future  -     C-reactive Protein; Future  -     Cryoglobulin; Future  -     Heavy Metals Profile II, Urine - Urine, Clean Catch; Future  -     LOUIS + PE; Future  -     Lyme Disease, Line Blot; Future  -     Rheumatoid Arthritis (RA) Profile; Future  -     Sjogren's Antibody, Anti-SS-A / -SS-B; Future  -     MRI Brain With & Without Contrast; Future    2. Claustrophobia  -     diazePAM " (Valium) 5 MG tablet; TAke 30 min prior to MRI and may repeat x1  Dispense: 2 tablet; Refill: 0    3. Tourette syndrome  Comments:  FU with psych       Assessment & Plan  1. Numbness and tingling.  Her EMG from June 2024 indicated mild sensory peripheral neuropathy, with no muscle involvement. No neurological deficits were observed during the examination. A comprehensive set of tests will be conducted, including muscle breakdown, CRP, cryoglobulins, heavy metals urine, and immune labs. An MRI of the brain with and without contrast will be ordered. Valium will be prescribed to be taken 30 minutes prior to the MRI, with a second dose available if necessary. She is advised to have a  for the MRI appointment.    2. Auditory tic.  The tic is more frequent when tired, cold, or under the influence of alcohol. She has been advised that the tic may be related to anxiety. No immediate medication changes are recommended until further testing is completed.    3. Factor V Leiden.  She is currently seeing a hematologist to assess the increased risk for blood clotting. She has been taken off birth control due to this condition and is taking aspirin daily. She is advised to continue her current regimen and follow up with her hematologist.    4. Vitamin B12 deficiency.  She is taking vitamin B12 supplements. Her levels have improved to 681. No changes to the current treatment plan are necessary.    5. Anxiety.  She is currently on Effexor. No changes to her medication are recommended at this time. Further evaluation will be conducted based on the results of the upcoming tests.          Patient Education:       Reviewed medications, potential side effects and signs and symptoms to report. Discussed risk versus benefits of treatment plan with patient and/or family-including medications, labs and radiology that may be ordered. Addressed questions and concerns during visit. Patient and/or family verbalized understanding and agree  with plan. Instructed to call the office with any questions and report to ER with any life-threatening symptoms.     Follow Up:   Return in about 3 months (around 2/11/2025) for Recheck.    During this visit the following were done:  Labs Reviewed [x]    Labs Ordered [x]    Radiology Reports Reviewed [x]    Radiology Ordered [x]    PCP Records Reviewed []    Referring Provider Records Reviewed []    ER Records Reviewed []    Hospital Records Reviewed []    History Obtained From Family []    Radiology Images Reviewed []    Other Reviewed [x]    Records Requested []      Patient or patient representative verbalized consent for the use of Ambient Listening during the visit with  Mei Sánchez DNP, APRN for chart documentation. 11/11/2024  12:42 EST      Mei Sánchez DNP, APRN

## 2024-11-13 LAB
CCP IGA+IGG SERPL IA-ACNC: 6 UNITS (ref 0–19)
ENA SS-A AB SER-ACNC: <0.2 AI (ref 0–0.9)
ENA SS-B AB SER-ACNC: <0.2 AI (ref 0–0.9)
RHEUMATOID FACT SERPL-ACNC: <10 IU/ML

## 2024-11-14 LAB
ALBUMIN SERPL ELPH-MCNC: 3.7 G/DL (ref 2.9–4.4)
ALBUMIN/GLOB SERPL: 1.3 {RATIO} (ref 0.7–1.7)
ALPHA1 GLOB SERPL ELPH-MCNC: 0.3 G/DL (ref 0–0.4)
ALPHA2 GLOB SERPL ELPH-MCNC: 0.7 G/DL (ref 0.4–1)
B-GLOBULIN SERPL ELPH-MCNC: 1.1 G/DL (ref 0.7–1.3)
GAMMA GLOB SERPL ELPH-MCNC: 1 G/DL (ref 0.4–1.8)
GLOBULIN SER-MCNC: 3 G/DL (ref 2.2–3.9)
IGA SERPL-MCNC: 297 MG/DL (ref 87–352)
IGG SERPL-MCNC: 974 MG/DL (ref 586–1602)
IGM SERPL-MCNC: 104 MG/DL (ref 26–217)
INTERPRETATION SERPL IEP-IMP: NORMAL
LABORATORY COMMENT REPORT: NORMAL
M PROTEIN SERPL ELPH-MCNC: NORMAL G/DL
PROT SERPL-MCNC: 6.7 G/DL (ref 6–8.5)

## 2024-11-15 LAB
B BURGDOR IGG PATRN SER IB-IMP: NEGATIVE
B BURGDOR IGM PATRN SER IB-IMP: NEGATIVE
B BURGDOR18KD IGG SER QL IB: NORMAL
B BURGDOR23KD IGG SER QL IB: NORMAL
B BURGDOR23KD IGM SER QL IB: NORMAL
B BURGDOR28KD IGG SER QL IB: NORMAL
B BURGDOR30KD IGG SER QL IB: NORMAL
B BURGDOR39KD IGG SER QL IB: NORMAL
B BURGDOR39KD IGM SER QL IB: NORMAL
B BURGDOR41KD IGG SER QL IB: NORMAL
B BURGDOR41KD IGM SER QL IB: NORMAL
B BURGDOR45KD IGG SER QL IB: NORMAL
B BURGDOR58KD IGG SER QL IB: NORMAL
B BURGDOR66KD IGG SER QL IB: NORMAL
B BURGDOR93KD IGG SER QL IB: NORMAL

## 2024-11-18 LAB — CRYOGLOB SER QL 1D COLD INC: NORMAL

## 2024-12-06 LAB
ARSENIC UR-MCNC: 16 UG/L (ref 0–9)
ARSENIC.INORGANIC+METHYLATED 24H UR-MCNC: <20 UG/L
ARSENIC/CREAT UR: 11 UG/G CREAT
CADMIUM 24H UR-MCNC: ABNORMAL UG/L
CREAT UR-MCNC: 1.5 G/L (ref 0.3–3)
DIMETHYLARSINATE UR-MCNC: <5 UG/L
INORG ARSENIC UR-MCNC: <10 UG/L
LEAD 24H UR-MCNC: ABNORMAL UG/L (ref 0–49)
Lab: NORMAL
MERCURY 24H UR-MCNC: ABNORMAL UG/L (ref 0–19)
MMA UR-MCNC: <5 UG/L

## 2024-12-09 ENCOUNTER — TELEPHONE (OUTPATIENT)
Dept: NEUROLOGY | Facility: CLINIC | Age: 29
End: 2024-12-09
Payer: COMMERCIAL

## 2024-12-09 NOTE — TELEPHONE ENCOUNTER
Called patient and gave results.    ----- Message from Mei Sánchez sent at 12/9/2024 12:58 PM EST -----  Please notify pt urine for heavy metal toxin is normal. Initial arsenic level was elevated but follow up labs showed it was from a biologic source in your diet with no concerns.  Cryoglobulins, lyme disease, immune labs, sjogren's diseaese, rheumatoid arthritis, muscle breakdown, and inflammatory markers are all normal.

## 2024-12-10 ENCOUNTER — TELEPHONE (OUTPATIENT)
Dept: ONCOLOGY | Facility: CLINIC | Age: 29
End: 2024-12-10

## 2024-12-10 NOTE — TELEPHONE ENCOUNTER
Caller: Tigist Ramos    Relationship to patient: Self    Best call back number:     [phone]     Chief complaint: PT HAS TO GET WORKED IN TO ANOTHER APPT TOMORROW AND NEEDS TO R/S     Type of visit: FOLLOW UP     Requested date: SOONEST AVAILABLE.      If rescheduling, when is the original appointment: 12/11/24

## 2024-12-17 ENCOUNTER — OFFICE VISIT (OUTPATIENT)
Dept: ONCOLOGY | Facility: CLINIC | Age: 29
End: 2024-12-17
Payer: COMMERCIAL

## 2024-12-17 VITALS
OXYGEN SATURATION: 99 % | HEART RATE: 73 BPM | RESPIRATION RATE: 18 BRPM | BODY MASS INDEX: 44.65 KG/M2 | SYSTOLIC BLOOD PRESSURE: 138 MMHG | HEIGHT: 65 IN | TEMPERATURE: 97.8 F | WEIGHT: 268 LBS | DIASTOLIC BLOOD PRESSURE: 90 MMHG

## 2024-12-17 DIAGNOSIS — D68.51 HETEROZYGOUS FACTOR V LEIDEN MUTATION: Primary | ICD-10-CM

## 2024-12-17 PROCEDURE — 99213 OFFICE O/P EST LOW 20 MIN: CPT | Performed by: INTERNAL MEDICINE

## 2024-12-17 NOTE — PROGRESS NOTES
"     [facility]ology [facility] On[facility]  Office number [phone]    Fax number [phone]    Follow up     Date: 24    Patient Name: Tigist Ramos  MRN: [id number]  : 1995    Referred by: LAUREN Addison    Chief Complaint: thrombophlebitis, factor V leiden heterozygous    History of Present Illness: Tigist Ramos is a pleasant 29 y.o. female who presents today for evaluation of superficial thrombophlebitis and FVL heterozygous.    She has had 2 episodes of thrombophlebitis prompting additional workup.  She underwent bilateral salpingectomy 7/3/2024 with Dr. Harini Barber as an outpatient at [facility]. Had an IV in her left arm, was discharged same day. Continued OCP in perioperative period.     Went to the ED  with swelling and redness and pain in left forearm. Per notes; \"Exam is consistent with superficial thrombophlebitis. We will prescribe Keflex 500 mg by mouth twice daily x 7 days\" and started  daily. Duplex the next day showed evidence of superficial thrombophlebitis.     Had IV placed in ED on right arm 24 and developed similar symptoms in her right arm in the days after her ED visit. On 7/15/24 she presented with right arm swelling, pain and redness and duplex Showed: acute right upper extremity superficial thrombophlebitis noted in the cephalic (upper arm) and cephalic (forearm). Continued  mg daily until symptom resolution and now on ASA 81 mg daily.     Has been on OCPs for 5 years for management of PCOS, and stopped OCPs at diagnosis of FVL heterozygous. Smoked and vaped marijuana daily until diagnosis, now cutting down. Does not use tobacco products.     She is a G0    The patients family history is notable for CVD but no VTE.   Father had PAD, several Mis starting in his late 30s or early 40s, needed amputation, he has lots of medical problems including smoking.  No known VTE.   Grandmother heart disease    She has seen cardiology in past and had " stress test and echo.     Interval history:   Works as massage therapist. Gets a sore right leg after work lasting <24 hours and resolving with rest. Does a lot of lunging, etc, bending at work and her whole body is sore, but more aware of the leg because of the symptoms.   Situational anxiety.   Remains off of OCPs, has spoken with her GYN and considering progesterone IUD.  Has a meeting with her psychiatrist and therapist soon.   Peripheral neuropathy stable. Continues B12 daily and has MRI brain pending with her neurologist 12/24/24    Past Medical History:   Past Medical History:   Diagnosis Date    ADHD (attention deficit hyperactivity disorder) 2021 or 2022    Have not been formally diagnosed, but have been screened twice and psychiatrist agrees. Taking non stimulant medication for treatment    Allergic 2015    Seasonal    Anxiety 2019    Formal diagnosis. Taking medication and weekly therapy as treatment    Depression 2019    Formally diagnosed. Weekly therapy as treatment    Hyperlipidemia 4/5/2024    Hypertension 2022    I suspect it is mostly anxiety related, not formally diagnosed, 142/90 last time checked in August    Obesity Unknown    Since childhood. Most of family is obese. Am currently addressing with Dietitian.       Past Surgical History: History reviewed. No pertinent surgical history.  S/P BTL    Family History:   Family History   Problem Relation Age of Onset    Anxiety disorder Mother         I am willing to bet that the majority of my family has undiagnosed mental illness including anxiety, adhd, depression. They display all the symptoms but don't consider mental health and do not seek medical attention    Depression Mother     Drug abuse Mother         Cigarettes, casual pill-taker    Alcohol abuse Father         Father, several aunts and uncles, grandfather    Depression Father         Undiagnosed, has attempted suicide    Drug abuse Father         Cigarettes, painkillers    Heart attack  Father         3 or 4 in lifetime. Smoking related    Alcohol abuse Paternal Grandfather     Depression Paternal Grandfather     Depression Brother         Two brothers have been diagnosed    Depression Brother     Hypertension Brother        Social History:   Social History     Socioeconomic History    Marital status:    Tobacco Use    Smoking status: Never     Passive exposure: Past    Smokeless tobacco: Never   Vaping Use    Vaping status: Never Used   Substance and Sexual Activity    Alcohol use: Yes     Alcohol/week: 2.0 standard drinks of alcohol     Types: 2 Cans of beer per week     Comment: Every other week, not weekly.    Drug use: Yes     Frequency: 10.0 times per week     Types: Marijuana     Comment: Only marijuana    Sexual activity: Yes     Partners: Female, Male     Birth control/protection: Condom, Birth control pill       Medications:     Current Outpatient Medications:     albuterol sulfate  (90 Base) MCG/ACT inhaler, Inhale 1-2 puffs As Needed. every 4 to 6 hours, Disp: , Rfl:     aspirin 81 MG EC tablet, Take 1 tablet by mouth Daily., Disp: , Rfl:     cloNIDine (CATAPRES) 0.1 MG tablet, Take 1 tablet by mouth 2 (Two) Times a Day As Needed., Disp: , Rfl:     guanFACINE (TENEX) 2 MG tablet, Take 1 tablet every day by oral route., Disp: , Rfl:     ipratropium (ATROVENT) 0.06 % nasal spray, USE 1 TO 2 SPRAYS IN EACH NOSTRIL TWICE DAILY AS NEEDED, Disp: , Rfl:     ketoconazole (NIZORAL) 2 % shampoo, Apply  topically to the appropriate area as directed 1 (One) Time Per Week., Disp: , Rfl:     Omega-3 Fatty Acids (fish oil) 1000 MG capsule capsule, Take 1 capsule by mouth Daily With Breakfast., Disp: , Rfl:     venlafaxine XR (EFFEXOR-XR) 150 MG 24 hr capsule, Take 1 capsule by mouth Daily., Disp: , Rfl:     vitamin B-12 (CYANOCOBALAMIN) 1000 MCG tablet, Take 1 tablet by mouth Daily., Disp: 90 tablet, Rfl: 1    diazePAM (Valium) 5 MG tablet, TAke 30 min prior to MRI and may repeat x1,  "Disp: 2 tablet, Rfl: 0    ibuprofen (ADVIL,MOTRIN) 600 MG tablet, Take 1 tablet by mouth Every 6 (Six) Hours As Needed., Disp: , Rfl:     Allergies:   No Known Allergies    Objective     Vital Signs:   Vitals:    12/17/24 1005   BP: 138/90   Pulse: 73   Resp: 18   Temp: 97.8 °F (36.6 °C)   TempSrc: Temporal   SpO2: 99%   Weight: 122 kg (268 lb)   Height: 165.1 cm (65\")   PainSc: 0-No pain    Body mass index is 44.6 kg/m².   Pain Score    12/17/24 1005   PainSc: 0-No pain       Physical Exam:   General: No acute distress. Well appearing   HEENT: Normocephalic, atraumatic. Sclera anicteric.   Neck: supple, no adenopathy.   Cardiovascular: regular rate and rhythm. No murmurs.   Respiratory: Normal rate. Clear to auscultation bilaterally.  Abdomen: Soft, nontender, non distended with normoactive bowel sounds.   Lymph: no cervical, supraclavicular or axillary adenopathy.  Neuro: Alert and oriented x 3. No focal deficits.   Ext: Symmetric, no swelling.     Laboratory/Imaging Reviewed:   No visits with results within 2 Week(s) from this visit.   Latest known visit with results is:   Lab on 11/11/2024   Component Date Value Ref Range Status    Creatine Kinase 11/11/2024 171  20 - 180 U/L Final    C-Reactive Protein 11/11/2024 <0.30  0.00 - 0.50 mg/dL Final    Cryoglobulin, Ql, Serum, Rflx 11/11/2024 Comment  None detected Final    None Detected at 72 hours    Creatinine, Urine 11/11/2024 1.50  0.30 - 3.00 g/L Final                                    Detection Limit = 0.10    Arsenic Ur 11/11/2024 16 (H)  0 - 9 ug/L Final                                    Detection Limit = 10    AS (TOTAL)/CRT RATIO 11/11/2024 11  ug/g creat Final    Lead, Urine 11/11/2024 None Detected  0 - 49 ug/L Final                                    Detection Limit = 1    Mercury, Urine 11/11/2024 None Detected  0 - 19 ug/L Final                                    Detection Limit = 1    Cadmium, Urine 11/11/2024 None Detected  None detected ug/L " Final                                    Detection Limit = 1.0    IgG 11/11/2024 974  586 - 1602 mg/dL Final    IgA 11/11/2024 297  87 - 352 mg/dL Final    IgM 11/11/2024 104  26 - 217 mg/dL Final    Total Protein 11/11/2024 6.7  6.0 - 8.5 g/dL Final    Albumin 11/11/2024 3.7  2.9 - 4.4 g/dL Final    Alpha-1-Globulin 11/11/2024 0.3  0.0 - 0.4 g/dL Final    Alpha-2-Globulin 11/11/2024 0.7  0.4 - 1.0 g/dL Final    Beta Globulin 11/11/2024 1.1  0.7 - 1.3 g/dL Final    Gamma Globulin 11/11/2024 1.0  0.4 - 1.8 g/dL Final    M-Irineo 11/11/2024 Not Observed  Not Observed g/dL Final    Globulin 11/11/2024 3.0  2.2 - 3.9 g/dL Final    A/G Ratio 11/11/2024 1.3  0.7 - 1.7 Final    Immunofixation Reflex, Serum 11/11/2024 Comment   Final    No monoclonality detected.    Please note 11/11/2024 Comment   Final    Protein electrophoresis scan will follow via computer, mail, or   delivery.    IgG P93 Ab. 11/11/2024 Absent   Final    IgG P66 Ab. 11/11/2024 Absent   Final    IgG P58 Ab. 11/11/2024 Absent   Final    IgG P45 Ab. 11/11/2024 Absent   Final    IgG P41 Ab. 11/11/2024 Absent   Final    IgG P39 Ab. 11/11/2024 Absent   Final    IgG P30 Ab. 11/11/2024 Absent   Final    IgG P28 Ab. 11/11/2024 Absent   Final    IgG P23 Ab. 11/11/2024 Absent   Final    IgG P18 Ab. 11/11/2024 Absent   Final    Lyme IgG Western Blot Interpretati* 11/11/2024 Negative   Final                         Positive: 5 of the following                                 Borrelia-specific bands:                                 18,23,28,30,39,41,45,58,                                 66, and 93.                       Negative: No bands or banding                                 patterns which do not                                 meet positive criteria.    IgM P41 Ab. 11/11/2024 Absent   Final    IgM P39 Ab. 11/11/2024 Absent   Final    IgM P23 Ab. 11/11/2024 Absent   Final    Lyme IgM Western Blot Interpretati* 11/11/2024 Negative   Final    Comment:  Note: An equivocal or positive EIA result followed by a negative  Line Blot result is considered NEGATIVE. An equivocal or positive  EIA result followed by a positive Line Blot is considered POSITIVE  by the CDC.  Positive: 2 of the following bands: 23,39 or 41  Negative: No bands or banding patterns which do not meet positive  criteria.  Criteria for positivity are those recommended by CDC/ASTPHLD.  p23=Osp C, c29=[id number]  Note:  Sera from individuals with the following may cross react in the  Lyme Line Blot assays: other spirochetal diseases (periodontal  disease, leptospirosis, relapsing fever, yaws, and pinta);  connective autoimmune (Rheumatoid Arthritis and Systemic Lupus  Erythematosus and also individuals with Antinuclear Antibody);  other infections ([place] Spotted Fever; Tye-Barr Virus,  and Cytomegalovirus).  Please Note: Lyme immunoblot alone is not recommended for the  diagnosis of Lyme disease. Current guidelines recommend the use  of a two-tiered approach                            to Lyme serology testing to improve the  sensitivity and specificity of testing. [employer] offers test code  557913 Lyme Disease Serology with Reflex to aid in the diagnosis  of Lyme Disease.    RA Latex Turbid 11/11/2024 <10.0  <14.0 IU/mL Final    CCP Antibodies IgG/IgA 11/11/2024 6  0 - 19 units Final                              Negative               <20                            Weak positive      20 - 39                            Moderate positive  40 - 59                            Strong positive        >59    Sjogren's Anti-SS-A 11/11/2024 <0.2  0.0 - 0.9 AI Final    Sjogren's Anti-SS-B 11/11/2024 <0.2  0.0 - 0.9 AI Final    Arsenic(Inorganic),U 11/11/2024 <10.0  ug/L Final    ARSENIC, MMA 11/11/2024 <5.0  ug/L Final    ARSENIC, DMA 11/11/2024 <5.0  ug/L Final    ARSENIC, TOTAL TOXIC 11/11/2024 <20.0  SALLY: 35 ug/L Final    Please note 11/11/2024 Comment   Final    Comment:      Seafood consumption  "2-3 days prior to specimen collection       can markedly elevate levels of total arsenic in urine.       This dietary form of arsenic is non-toxic and is comprised       primarily of arsenobetaine and arsenocholine.       For monitoring exposure to the toxic forms of arsenic,       the test for Arsenic, total toxic species is appropriate.       Toxic species of arsenic include inorganic elemental arsenic       (Arsenic (III) and Arsenic (V)) and methylated metabolites       (monomethylarsonic acid (MMA) and dimethylarsinic acid       (DMA)). All measurements are expressed as the Arsenic       component only.                                         Total Toxic Species                                         --------------------       Normal (Unexposed Population):    Below detection limits          Values below detection limit are reported as \"<\" the          detection limit.       Biological Exposure Index (SALLY):   35.0 ug/L         (end of workweek)                                  Arsenic speciation performed by liquid chromatography with       inductively-coupled plasma/mass spectrometry (HPLC-ICP/MS).       This test was developed and its performance characteristics       determined by [employer]. It has not been cleared or approved       by the Food and Drug Administration.     Component      Latest Ref Rng 9/23/2024   Protime      sec 10.3    INR      ratio 0.9    PTT      sec 26.7    APTT 1:1 NP      sec TNP    APTT 1:1 Saline      sec TNP    Thrombin Time      sec 17.1    DRVVT Screen Seconds      sec 46.3    DRVVT Confirm Seconds      sec TNP    DRVVT/Confirm Ratio      ratio TNP    Hex Phosph Neut Test      sec 6    Platelet Neutralization      sec 1.9    Anticardiolipin IgG      GPL <10    Anticardiolipin IgM      MPL <10    Beta-2 Glyco 1 IgG      SGU <10    Beta-2 Glyco 1 IgM      SMU <10    Beta-2 Glyco 1 IgA      DONALDO <10    LAC Interpretation Comment    Folate      4.78 - 24.20 ng/mL 9.44    Vitamin " B-12      211 - 946 pg/mL 681    Protein S Ag, Total      60 - 150 % 56 (L)    Protein S Ag, Free      61 - 136 % 90    Specimen Status Comment    Interpretation Comment    Antithrombin Antigen      72 - 124 % 70 (L)    Protein C Antigen      60 - 150 % 92    Factor V Leiden      Normal  Heterozygous !    Hemoglobin A1C      4.80 - 5.60 % 5.10    Protein C-Functional      73 - 180 % 115       Component      Latest Ref Rng 10/24/2024   Protein S Ag, Total      60 - 150 % 76    Protein S Ag, Free      61 - 136 % 94    AntiThromb III Func      75 - 135 % 101    Protein S Functional      63 - 140 % 75    Factor II, DNA Analysis      Normal  Normal        No results found.    Assessment / Plan      Assessment/Plan:   Factor V Leiden heterozygous  Mild reduction in protein S Ag with no activity level available, repeat normal  Mild reduction in Antithrombin level that does not meet the level anticipated for AT deficiency, repeat normal  4.   H/o recurrent superficial thrombophlebitis  We discussed the diagnosis and prognosis of factor V Leiden heterozygous which is associated with an increased risk for VTE, on the order of 5-10% over her lifetime. She has not had a VTE event, although she has experience 2 episodes of provoked thrombophlebitis while on OCPs. We discussed her additional labs as above including normal prothrombin gene and normal protein S and AT3 which is not consistent with combined deficiency.    -She is s/p BTL and has discontinued OCPs. I recommend avoiding OCPs (and HRT after menopause). While there is no high quality evidence to support antiplatelet agents, a daily ASA 81 mg EC is reasonable if she can tolerate it, particularly during situations such as long distance travel. She prefers to be proactive and plans to continue ASA 81 mg.     -She should receive prophylactic anticoagulation while hospitalized, following high risk surgeries such as orthopedic and intraabdominal surgery, and during prolonged  periods of immobility such as following orthopedic procedures/injuries. We discussed these recommendations today.   -She may follow up on a PRN basis      Follow Up:   PRN     Soraida Garza MD  Hematology and Oncology

## 2025-01-30 ENCOUNTER — DOCUMENTATION (OUTPATIENT)
Dept: NEUROLOGY | Facility: CLINIC | Age: 30
End: 2025-01-30
Payer: COMMERCIAL

## 2025-02-11 ENCOUNTER — TELEMEDICINE (OUTPATIENT)
Dept: INTERNAL MEDICINE | Facility: CLINIC | Age: 30
End: 2025-02-11
Payer: COMMERCIAL

## 2025-02-11 DIAGNOSIS — D68.51 FACTOR V LEIDEN: ICD-10-CM

## 2025-02-11 DIAGNOSIS — J45.20 MILD INTERMITTENT ASTHMA WITHOUT COMPLICATION: ICD-10-CM

## 2025-02-11 DIAGNOSIS — F41.1 GAD (GENERALIZED ANXIETY DISORDER): Primary | ICD-10-CM

## 2025-02-11 DIAGNOSIS — F90.9 ATTENTION DEFICIT HYPERACTIVITY DISORDER (ADHD), UNSPECIFIED ADHD TYPE: ICD-10-CM

## 2025-02-11 PROCEDURE — 99214 OFFICE O/P EST MOD 30 MIN: CPT | Performed by: PHYSICIAN ASSISTANT

## 2025-02-11 RX ORDER — DEXTROAMPHETAMINE SACCHARATE, AMPHETAMINE ASPARTATE, DEXTROAMPHETAMINE SULFATE AND AMPHETAMINE SULFATE 2.5; 2.5; 2.5; 2.5 MG/1; MG/1; MG/1; MG/1
10 TABLET ORAL DAILY
Start: 2025-02-11 | End: 2025-02-11

## 2025-02-11 NOTE — PROGRESS NOTES
[facility]  [address]  [place] [place] [address]  Dept: [phone]  Dept Fax: [phone]  Loc: [phone]  Loc Fax: [phone]    Tigist Ramos  1995    Televisit Note    You have chosen to receive care through a telephone visit. Do you consent to use a telephone visit for your medical care today? Yes. Pt at home and provider working from home during visit today.    History of Present Illness:  Tigist Ramos is a 29 y.o. female who presents via video-conference for asthma, anxiety, and ADHD.  On adderall as directed for ADHD without any problems or side effects.  Reports good symptomatic control.     On Effexor for anxiety.  Reports good symptomatic control.     Uses albuterol inhaler for asthma as directed.     Patient overall doing well and feeling well.    The following portions of the patient's history were reviewed and updated as appropriate: allergies, current medications, past family history, past medical history, past social history, past surgical history, and problem list.    This visit was scheduled as a phone visit to comply with patient safety concerns in accordance with CDC recommendations.  Time spent in discussion with the patient was 20 minutes.     Medications    Current Outpatient Medications:     albuterol sulfate  (90 Base) MCG/ACT inhaler, Inhale 1-2 puffs As Needed. every 4 to 6 hours, Disp: , Rfl:     aspirin 81 MG EC tablet, Take 1 tablet by mouth Daily., Disp: , Rfl:     cloNIDine (CATAPRES) 0.1 MG tablet, Take 1 tablet by mouth 2 (Two) Times a Day As Needed., Disp: , Rfl:     diazePAM (Valium) 5 MG tablet, TAke 30 min prior to MRI and may repeat x1, Disp: 2 tablet, Rfl: 0    guanFACINE (TENEX) 2 MG tablet, Take 1 tablet every day by oral route., Disp: , Rfl:     ibuprofen (ADVIL,MOTRIN) 600 MG tablet, Take 1 tablet by mouth Every 6 (Six) Hours As Needed., Disp: , Rfl:     ipratropium (ATROVENT) 0.06 %  nasal spray, USE 1 TO 2 SPRAYS IN EACH NOSTRIL TWICE DAILY AS NEEDED, Disp: , Rfl:     ketoconazole (NIZORAL) 2 % shampoo, Apply  topically to the appropriate area as directed 1 (One) Time Per Week., Disp: , Rfl:     Omega-3 Fatty Acids (fish oil) 1000 MG capsule capsule, Take 1 capsule by mouth Daily With Breakfast., Disp: , Rfl:     venlafaxine XR (EFFEXOR-XR) 150 MG 24 hr capsule, Take 1 capsule by mouth Daily., Disp: , Rfl:     vitamin B-12 (CYANOCOBALAMIN) 1000 MCG tablet, Take 1 tablet by mouth Daily., Disp: 90 tablet, Rfl: 1    Subjective  No Known Allergies     Past Medical History:   Diagnosis Date    ADHD (attention deficit hyperactivity disorder) 2021 or 2022    Have not been formally diagnosed, but have been screened twice and psychiatrist agrees. Taking non stimulant medication for treatment    Allergic 2015    Seasonal    Anxiety 2019    Formal diagnosis. Taking medication and weekly therapy as treatment    Depression 2019    Formally diagnosed. Weekly therapy as treatment    Hyperlipidemia 4/5/2024    Hypertension 2022    I suspect it is mostly anxiety related, not formally diagnosed, 142/90 last time checked in August    Obesity Unknown    Since childhood. Most of family is obese. Am currently addressing with Dietitian.       History reviewed. No pertinent surgical history.    Family History   Problem Relation Age of Onset    Anxiety disorder Mother         I am willing to bet that the majority of my family has undiagnosed mental illness including anxiety, adhd, depression. They display all the symptoms but don't consider mental health and do not seek medical attention    Depression Mother     Drug abuse Mother         Cigarettes, casual pill-taker    Alcohol abuse Father         Father, several aunts and uncles, grandfather    Depression Father         Undiagnosed, has attempted suicide    Drug abuse Father         Cigarettes, painkillers    Heart attack Father         3 or 4 in lifetime. Smoking  related    Alcohol abuse Paternal Grandfather     Depression Paternal Grandfather     Depression Brother         Two brothers have been diagnosed    Depression Brother     Hypertension Brother         Social History     Socioeconomic History    Marital status:    Tobacco Use    Smoking status: Never     Passive exposure: Past    Smokeless tobacco: Never   Vaping Use    Vaping status: Never Used   Substance and Sexual Activity    Alcohol use: Yes     Alcohol/week: 2.0 standard drinks of alcohol     Types: 2 Cans of beer per week     Comment: Every other week, not weekly.    Drug use: Yes     Frequency: 10.0 times per week     Types: Marijuana     Comment: Only marijuana    Sexual activity: Yes     Partners: Female, Male     Birth control/protection: Condom, Birth control pill         Objective  There were no vitals filed for this visit.  There is no height or weight on file to calculate BMI.    Assessment  Diagnoses and all orders for this visit:    1. CAITLIN (generalized anxiety disorder) (Primary)    2. Mild intermittent asthma without complication    3. Attention deficit hyperactivity disorder (ADHD), unspecified ADHD type  -     Discontinue: amphetamine-dextroamphetamine (ADDERALL) 10 MG tablet; Take 1 tablet by mouth Daily.    4. Factor V Leiden        Plan    1. CAITLIN (generalized anxiety disorder) (Primary)- well-controlled on Effexor.  Keep same.  Continue to monitor.     2. Mild intermittent asthma without complication- stable on albuterol inhaler.     3. Attention deficit hyperactivity disorder (ADHD), unspecified ADHD type- well-controlled on adderall.  Continue to follow with psych.     4. Factor V- cont to foll with hem/onc.      Return in about 4 weeks (around 3/11/2025) for Annual physical.    Emil Miller PA-C  02/11/2025

## 2025-02-17 ENCOUNTER — TELEPHONE (OUTPATIENT)
Dept: NEUROLOGY | Facility: CLINIC | Age: 30
End: 2025-02-17

## 2025-02-17 NOTE — TELEPHONE ENCOUNTER
Caller: Tigist Ramos    Relationship:  Self    Best call back number: [phone]    PATIENT CALLED REQUESTING TO CANCEL SAME DAY APPT.    Did the patient call AFTER the start time of their scheduled appointment?  []YES  [x]NO    Was the patient's appointment rescheduled? [x]YES  []NO    Any additional information:   PT CALLED ONE MINUTE BEFORE APPT. SHE WAS ON HER WAY TO APPT AND GOT BEHIND A WRECK

## 2025-02-24 ENCOUNTER — OFFICE VISIT (OUTPATIENT)
Dept: NEUROLOGY | Facility: CLINIC | Age: 30
End: 2025-02-24
Payer: COMMERCIAL

## 2025-02-24 VITALS
WEIGHT: 274.8 LBS | SYSTOLIC BLOOD PRESSURE: 124 MMHG | HEIGHT: 65 IN | DIASTOLIC BLOOD PRESSURE: 94 MMHG | BODY MASS INDEX: 45.79 KG/M2 | HEART RATE: 104 BPM | OXYGEN SATURATION: 97 %

## 2025-02-24 DIAGNOSIS — R20.0 NUMBNESS AND TINGLING: Primary | ICD-10-CM

## 2025-02-24 DIAGNOSIS — F95.2 TOURETTE SYNDROME: ICD-10-CM

## 2025-02-24 DIAGNOSIS — R20.2 NUMBNESS AND TINGLING: Primary | ICD-10-CM

## 2025-02-24 PROCEDURE — 99214 OFFICE O/P EST MOD 30 MIN: CPT | Performed by: NURSE PRACTITIONER

## 2025-02-24 RX ORDER — DEXTROAMPHETAMINE SACCHARATE, AMPHETAMINE ASPARTATE, DEXTROAMPHETAMINE SULFATE AND AMPHETAMINE SULFATE 2.5; 2.5; 2.5; 2.5 MG/1; MG/1; MG/1; MG/1
10 TABLET ORAL DAILY
COMMUNITY

## 2025-02-24 NOTE — PROGRESS NOTES
Neuro Office Visit      Encounter Date: 2025   Patient Name: Tigist Ramos  : 1995   MRN: [id number]   PCP: LAUREN Addison  Chief Complaint:    Chief Complaint   Patient presents with    Numbness     Cancelled appt 25  No show MRI x 2      History of Present Illness: Tigist Ramos is a 29 y.o. female who is here today in Neurology for  numbness and tingling, tic, claustrophobia      Last visit 2024-labs, MRI brain, FU w psych  Did not have MRI due to ins    Labs:heavy metal, sjogren's, RF, lyme, LOUIS, cryoglobulin, crp, ck-nml  History of Present Illness  The patient presents for evaluation of numbness and tingling in the hands.    Significant improvement noted with no recent episodes of numbness or tingling in the feet. However, symptoms persist in the hands, predominantly at night and during morning hours while in bed. The numbness is most pronounced in three fingers, leading to suspicion of carpal tunnel syndrome, although no formal diagnosis has been made. No attempt to use a splint for symptom management has been made. An MRI has not been performed due to a lapse in health insurance coverage following a job change, but there is now a desire to proceed with the MRI under the 's insurance policy.              MEDICATIONS  Current: fish oil pill  Tic  She experiences an auditory tic, which began when she was 23 or 24 years old. Initially, it was associated with cold chills up her spine, but it has since become more frequent and intense, sometimes occurring 10 to 15 times in a row. The tic is more pronounced when she is tired or cold. She has seen a psychiatrist for ADHD and anxiety, who suggested that her tic may be related to anxiety.     Paresthesia  Pt reports numbness in now only in hands while sleeping in bed. No longer in feet.      She reports experiencing numbness and tingling in her hands and feet, which started in . This sensation, described as  paresthesia, is more frequent in her hands and often occurs upon waking. Despite this, she does not report any weakness in her hands or feet. The numbness and tingling are more frequent and extends to the wrist and towards the middle of her feet from the toes.      She has a history of vitamin B12 deficiency and takes supplements for this. She has sprained her ankle multiple times. She is not aware of any exposure to toxins such as mercury, lead, or arsenic, and reports no history of radiation or chemotherapy. She also reports no infections such as hepatitis or HIV.     She has noticed a slight deterioration in one eye, with occasional blurring of words when reading. She reports no loss of vision, double vision, slurred speech, or difficulty swallowing. She also reports no bladder or bowel issues. She has a weak pelvic floor and has undergone physical therapy for this. She reports no falls and maintains a steady gait. She does not experience any sensation of being squeezed or feeling a lightning bolt when lowering her chin to her chest. She experiences constant itchiness on her back, particularly in the upper center area, which she describes as patchy.     She tested positive for factor V Leiden and is seeing a hematologist to assess her increased risk for blood clotting. She has stopped taking birth control due to this. She exercises at the gym four times a week and takes aspirin daily. She is trying to quit smoking.     She has a history of alopecia and takes fish oil supplements. She also has PCOS.               EMG & Nerve Conduction Test (06/12/2024 13:35)mild senosory peripheral neuropathy.        Marijuana Use  Vape pen 1-2 times a day and edible.        PMH: Leiden Factor V, HTN, anxiety, HLD, asthma, pcos, adhd, vit b12 def.  FH: anxiety, depression, drug abuse, etoh, htn  SH:, -tob, +etoh, +THC. Massage therapist.     Subjective      Past Medical History:   Past Medical History:   Diagnosis Date     ADHD (attention deficit hyperactivity disorder) 2021 or 2022    Have not been formally diagnosed, but have been screened twice and psychiatrist agrees. Taking non stimulant medication for treatment    Allergic 2015    Seasonal    Anxiety 2019    Formal diagnosis. Taking medication and weekly therapy as treatment    Depression 2019    Formally diagnosed. Weekly therapy as treatment    Hyperlipidemia 4/5/2024    Hypertension 2022    I suspect it is mostly anxiety related, not formally diagnosed, 142/90 last time checked in August    Obesity Unknown    Since childhood. Most of family is obese. Am currently addressing with Dietitian.       Past Surgical History: History reviewed. No pertinent surgical history.    Family History:   Family History   Problem Relation Age of Onset    Anxiety disorder Mother         I am willing to bet that the majority of my family has undiagnosed mental illness including anxiety, adhd, depression. They display all the symptoms but don't consider mental health and do not seek medical attention    Depression Mother     Drug abuse Mother         Cigarettes, casual pill-taker    Alcohol abuse Father         Father, several aunts and uncles, grandfather    Depression Father         Undiagnosed, has attempted suicide    Drug abuse Father         Cigarettes, painkillers    Heart attack Father         3 or 4 in lifetime. Smoking related    Alcohol abuse Paternal Grandfather     Depression Paternal Grandfather     Depression Brother         Two brothers have been diagnosed    Depression Brother     Hypertension Brother        Social History:   Social History     Socioeconomic History    Marital status:    Tobacco Use    Smoking status: Never     Passive exposure: Past    Smokeless tobacco: Never   Vaping Use    Vaping status: Never Used   Substance and Sexual Activity    Alcohol use: Yes     Alcohol/week: 2.0 standard drinks of alcohol     Types: 2 Cans of beer per week     Comment: Every  other week, not weekly.    Drug use: Yes     Frequency: 10.0 times per week     Types: Marijuana     Comment: Only marijuana    Sexual activity: Yes     Partners: Female, Male     Birth control/protection: Condom, Birth control pill       Medications:     Current Outpatient Medications:     albuterol sulfate  (90 Base) MCG/ACT inhaler, Inhale 1-2 puffs As Needed. every 4 to 6 hours, Disp: , Rfl:     amphetamine-dextroamphetamine (ADDERALL) 10 MG tablet, Take 1 tablet by mouth Daily., Disp: , Rfl:     aspirin 81 MG EC tablet, Take 1 tablet by mouth Daily., Disp: , Rfl:     cloNIDine (CATAPRES) 0.1 MG tablet, Take 1 tablet by mouth 2 (Two) Times a Day As Needed., Disp: , Rfl:     diazePAM (Valium) 5 MG tablet, TAke 30 min prior to MRI and may repeat x1, Disp: 2 tablet, Rfl: 0    guanFACINE (TENEX) 2 MG tablet, Take 1 tablet every day by oral route., Disp: , Rfl:     ipratropium (ATROVENT) 0.06 % nasal spray, USE 1 TO 2 SPRAYS IN EACH NOSTRIL TWICE DAILY AS NEEDED, Disp: , Rfl:     ketoconazole (NIZORAL) 2 % shampoo, Apply  topically to the appropriate area as directed 1 (One) Time Per Week., Disp: , Rfl:     Omega-3 Fatty Acids (fish oil) 1000 MG capsule capsule, Take 1 capsule by mouth Daily With Breakfast., Disp: , Rfl:     venlafaxine XR (EFFEXOR-XR) 150 MG 24 hr capsule, Take 1 capsule by mouth Daily., Disp: , Rfl:     vitamin B-12 (CYANOCOBALAMIN) 1000 MCG tablet, Take 1 tablet by mouth Daily., Disp: 90 tablet, Rfl: 1    Allergies:   No Known Allergies    PHQ-9 Total Score:     STEADI Fall Risk Assessment has not been completed.    Objective     Physical Exam:   Physical Exam  Constitutional:       General: She is not in acute distress.     Appearance: Normal appearance. She is not ill-appearing or toxic-appearing.   HENT:      Head: Normocephalic and atraumatic.      Nose: Nose normal.   Eyes:      General:         Right eye: No discharge.         Left eye: No discharge.      Conjunctiva/sclera:  "Conjunctivae normal.   Pulmonary:      Effort: Pulmonary effort is normal. No respiratory distress.   Neurological:      General: No focal deficit present.      Mental Status: She is alert and oriented to person, place, and time. Mental status is at baseline.   Psychiatric:         Mood and Affect: Mood normal.         Behavior: Behavior normal.         Thought Content: Thought content normal.         Judgment: Judgment normal.         Neurological Exam  Mental Status  Alert. Oriented to person, place, and time.    Sensory  Neg Phalen and Tinel's sign.     Physical Exam        Vital Signs:   Vitals:    02/24/25 1050   BP: 124/94   Pulse: 104   SpO2: 97%   Weight: 125 kg (274 lb 12.8 oz)   Height: 165.1 cm (65\")     Body mass index is 45.73 kg/m².         Assessment / Plan      Assessment/Plan:   Diagnoses and all orders for this visit:    1. Numbness and tingling (Primary)  Comments:  Will plan to wear cock up splints at night  Orders:  -     MRI Brain With & Without Contrast; Future    2. Tourette syndrome  -     MRI Brain With & Without Contrast; Future       Assessment & Plan  1. Numbness and tingling in hands.  Her symptoms have shown improvement, with numbness and tingling now only present in her hands, particularly at night and in the morning. The previous EMG indicated mild sensory peripheral neuropathy. Differential diagnoses include carpal tunnel syndrome, a pinched nerve in C7 or C8, or polyneuropathy due to a systemic issue like diabetes. The MRI is to rule out a larger systemic autoimmune problem such as multiple sclerosis. An MRI of the brain with and without contrast will be ordered. She is advised to take over-the-counter baby aspirin to prevent thrombophlebitis during the MRI procedure. She is also recommended to use over-the-counter cock-up splints during sleep for 4 to 6 months to alleviate hand symptoms. If the MRI and blood work are negative, an EMG will be considered.    Follow-up  The patient " will follow up in 3 months.        Patient Education:       Reviewed medications, potential side effects and signs and symptoms to report. Discussed risk versus benefits of treatment plan with patient and/or family-including medications, labs and radiology that may be ordered. Addressed questions and concerns during visit. Patient and/or family verbalized understanding and agree with plan. Instructed to call the office with any questions and report to ER with any life-threatening symptoms.     Follow Up:   Return in about 3 months (around 5/24/2025) for Recheck.    During this visit the following were done:  Labs Reviewed []    Labs Ordered []    Radiology Reports Reviewed []    Radiology Ordered []    PCP Records Reviewed []    Referring Provider Records Reviewed []    ER Records Reviewed []    Hospital Records Reviewed []    History Obtained From Family []    Radiology Images Reviewed []    Other Reviewed [x]    Records Requested []      Patient or patient representative verbalized consent for the use of Ambient Listening during the visit with  Mei Sánchez DNP, APRN for chart documentation. 2/24/2025  08:38 EST      Mei Sánchez DNP, APRN

## 2025-03-11 ENCOUNTER — TELEPHONE (OUTPATIENT)
Dept: NEUROLOGY | Facility: CLINIC | Age: 30
End: 2025-03-11

## 2025-03-11 NOTE — TELEPHONE ENCOUNTER
Caller: JOYCELYN BEDOYA    Best call back number: [phone]     What was the call regarding: THE COPY OF THE MRI ORDERS NEED TO BE SENT TO [facility]. THEIR FAX # [phone]. THEY SHE IS SCHEDULED FOR 3/26 @ 1:20.     PLEASE REVIEW  THANK YOU

## 2025-03-13 NOTE — TELEPHONE ENCOUNTER
JOYCELYN BEDOYA IS CALLING AGAIN ABOUT SUBMITTING THE ORDERS TO THE NEW FACILITY FOR THE MRI. SHE STATED THEY HAVE NOT RECEIVED IT.     PLEASE REVIEW  THANK YOU

## 2025-03-17 DIAGNOSIS — R11.0 CHRONIC NAUSEA: Primary | ICD-10-CM

## 2025-04-22 ENCOUNTER — RESULTS FOLLOW-UP (OUTPATIENT)
Dept: NEUROLOGY | Facility: CLINIC | Age: 30
End: 2025-04-22
Payer: COMMERCIAL

## 2025-04-22 NOTE — TELEPHONE ENCOUNTER
Called patient and gave results.  Told patient provider would like disc and gave number to [facility].    Please notify pt MRI of brain showed mild white matter lesions which are common and usually benign. Otherwise, mri of brain is normal with no concerning findings.   Please drop off the disc with your MRI images for my review.  Thanks

## 2025-05-12 ENCOUNTER — TELEPHONE (OUTPATIENT)
Dept: NEUROLOGY | Facility: CLINIC | Age: 30
End: 2025-05-12
Payer: COMMERCIAL

## 2025-05-16 ENCOUNTER — TELEPHONE (OUTPATIENT)
Dept: NEUROLOGY | Facility: CLINIC | Age: 30
End: 2025-05-16
Payer: COMMERCIAL

## 2025-05-19 ENCOUNTER — TELEPHONE (OUTPATIENT)
Dept: NEUROLOGY | Facility: CLINIC | Age: 30
End: 2025-05-19
Payer: COMMERCIAL

## 2025-07-07 ENCOUNTER — OFFICE VISIT (OUTPATIENT)
Dept: INTERNAL MEDICINE | Age: 30
End: 2025-07-07
Payer: COMMERCIAL

## 2025-07-07 VITALS
HEART RATE: 82 BPM | BODY MASS INDEX: 46.48 KG/M2 | HEIGHT: 65 IN | DIASTOLIC BLOOD PRESSURE: 78 MMHG | WEIGHT: 279 LBS | OXYGEN SATURATION: 97 % | SYSTOLIC BLOOD PRESSURE: 138 MMHG | TEMPERATURE: 96.8 F

## 2025-07-07 DIAGNOSIS — M79.672 BILATERAL FOOT PAIN: ICD-10-CM

## 2025-07-07 DIAGNOSIS — M79.671 BILATERAL FOOT PAIN: ICD-10-CM

## 2025-07-07 DIAGNOSIS — R03.0 ELEVATED BLOOD PRESSURE READING: ICD-10-CM

## 2025-07-07 DIAGNOSIS — Z00.00 ANNUAL PHYSICAL EXAM: Primary | ICD-10-CM

## 2025-07-07 PROCEDURE — 99395 PREV VISIT EST AGE 18-39: CPT | Performed by: PHYSICIAN ASSISTANT

## 2025-07-07 NOTE — PROGRESS NOTES
[facility]  [address]  [place] [place] [address]  Dept: [phone]  Dept Fax: [phone]  Loc: [phone]  Loc Fax: [phone]    Tigist Ramos  1995    Follow Up Office Visit Note    History of Present Illness:  Patient 29-year-old female in today for annual physical.  Overall doing well and feeling well.  Blood pressure slightly elevated.  Reports she has been under some stress recently.  Denies any symptoms.    Ankle Injury         The following portions of the patient's history were reviewed and updated as appropriate: allergies, current medications, past family history, past medical history, past social history, past surgical history, and problem list.    Medications:    Current Outpatient Medications:     albuterol sulfate  (90 Base) MCG/ACT inhaler, Inhale 1-2 puffs As Needed. every 4 to 6 hours, Disp: , Rfl:     amphetamine-dextroamphetamine (ADDERALL) 10 MG tablet, Take 1 tablet by mouth Daily., Disp: , Rfl:     aspirin 81 MG EC tablet, Take 1 tablet by mouth Daily., Disp: , Rfl:     cloNIDine (CATAPRES) 0.1 MG tablet, Take 1 tablet by mouth 2 (Two) Times a Day As Needed., Disp: , Rfl:     diazePAM (Valium) 5 MG tablet, TAke 30 min prior to MRI and may repeat x1, Disp: 2 tablet, Rfl: 0    guanFACINE (TENEX) 2 MG tablet, Take 1 tablet every day by oral route., Disp: , Rfl:     ipratropium (ATROVENT) 0.06 % nasal spray, USE 1 TO 2 SPRAYS IN EACH NOSTRIL TWICE DAILY AS NEEDED, Disp: , Rfl:     ketoconazole (NIZORAL) 2 % shampoo, Apply  topically to the appropriate area as directed 1 (One) Time Per Week., Disp: , Rfl:     Omega-3 Fatty Acids (fish oil) 1000 MG capsule capsule, Take 1 capsule by mouth Daily With Breakfast., Disp: , Rfl:     venlafaxine XR (EFFEXOR-XR) 150 MG 24 hr capsule, Take 1 capsule by mouth Daily., Disp: , Rfl:     vitamin B-12 (CYANOCOBALAMIN) 1000 MCG tablet, Take 1 tablet by mouth Daily., Disp: 90  tablet, Rfl: 1    Subjective  No Known Allergies     Past Medical History:   Diagnosis Date    ADHD (attention deficit hyperactivity disorder) 2021 or 2022    Have not been formally diagnosed, but have been screened twice and psychiatrist agrees. Taking non stimulant medication for treatment    Allergic 2015    Seasonal    Anxiety 2019    Formal diagnosis. Taking medication and weekly therapy as treatment    Depression 2019    Formally diagnosed. Weekly therapy as treatment    Hyperlipidemia 4/5/2024    Hypertension 2022    I suspect it is mostly anxiety related, not formally diagnosed, 142/90 last time checked in August    Obesity Unknown    Since childhood. Most of family is obese. Am currently addressing with Dietitian.       History reviewed. No pertinent surgical history.    Family History   Problem Relation Age of Onset    Anxiety disorder Mother         I am willing to bet that the majority of my family has undiagnosed mental illness including anxiety, adhd, depression. They display all the symptoms but don't consider mental health and do not seek medical attention    Depression Mother     Drug abuse Mother         Cigarettes, casual pill-taker    Alcohol abuse Father         Father, several aunts and uncles, grandfather    Depression Father         Undiagnosed, has attempted suicide    Drug abuse Father         Cigarettes, painkillers    Heart attack Father         3 or 4 in lifetime. Smoking related    Alcohol abuse Paternal Grandfather     Depression Paternal Grandfather     Depression Brother         Two brothers have been diagnosed    Depression Brother     Hypertension Brother         Social History     Socioeconomic History    Marital status:    Tobacco Use    Smoking status: Never     Passive exposure: Past    Smokeless tobacco: Never   Vaping Use    Vaping status: Never Used   Substance and Sexual Activity    Alcohol use: Yes     Alcohol/week: 2.0 standard drinks of alcohol     Types: 2 Cans  "of beer per week     Comment: Every other week, not weekly.    Drug use: Yes     Frequency: 10.0 times per week     Types: Marijuana     Comment: Only marijuana    Sexual activity: Yes     Partners: Female, Male     Birth control/protection: Condom, Birth control pill       Review of Systems   Constitutional:  Negative for activity change, chills, fatigue, fever and unexpected weight change.   HENT:  Negative for congestion, ear pain, postnasal drip, sinus pressure and sore throat.    Eyes:  Negative for pain, discharge and redness.   Respiratory:  Negative for cough, shortness of breath and wheezing.    Cardiovascular:  Negative for chest pain, palpitations and leg swelling.   Gastrointestinal:  Negative for diarrhea, nausea and vomiting.   Endocrine: Negative for cold intolerance and heat intolerance.   Genitourinary:  Negative for decreased urine volume and dysuria.   Musculoskeletal:  Negative for arthralgias and myalgias.   Skin:  Negative for rash and wound.   Neurological:  Negative for dizziness, light-headedness and headaches.   Hematological:  Does not bruise/bleed easily.   Psychiatric/Behavioral:  Negative for confusion, dysphoric mood and sleep disturbance. The patient is not nervous/anxious.          Objective  Vitals:    07/07/25 1127 07/07/25 1157   BP: 144/78 138/78   BP Location: Left arm    Patient Position: Sitting    Cuff Size: Large Adult    Pulse: 82    Temp: 96.8 °F (36 °C)    TempSrc: Temporal    SpO2: 97%    Weight: 127 kg (279 lb)    Height: 165.1 cm (65\")      Body mass index is 46.43 kg/m².     Physical Exam  Physical Exam  Vitals and nursing note reviewed.   Constitutional:       General: She is not in acute distress.     Appearance: She is not ill-appearing.   HENT:      Head: Normocephalic.      Right Ear: Tympanic membrane, ear canal and external ear normal. There is no impacted cerumen.      Left Ear: Tympanic membrane, ear canal and external ear normal. There is no impacted " cerumen.      Nose: No congestion or rhinorrhea.      Mouth/Throat:      Mouth: Mucous membranes are moist.      Pharynx: Oropharynx is clear. No oropharyngeal exudate or posterior oropharyngeal erythema.   Eyes:      General:         Right eye: No discharge.         Left eye: No discharge.      Extraocular Movements: Extraocular movements intact.      Conjunctiva/sclera: Conjunctivae normal.      Pupils: Pupils are equal, round, and reactive to light.   Cardiovascular:      Rate and Rhythm: Normal rate and regular rhythm.      Heart sounds: Normal heart sounds. No murmur heard.     No friction rub. No gallop.   Pulmonary:      Effort: Pulmonary effort is normal. No respiratory distress.      Breath sounds: Normal breath sounds. No wheezing.   Abdominal:      General: Bowel sounds are normal. There is no distension.      Palpations: Abdomen is soft. There is no mass.      Tenderness: There is no abdominal tenderness.   Musculoskeletal:         General: No swelling. Normal range of motion.      Cervical back: Normal range of motion. No tenderness.      Right lower leg: No edema.      Left lower leg: No edema.   Lymphadenopathy:      Cervical: No cervical adenopathy.   Skin:     Findings: No bruising, erythema or rash.   Neurological:      Mental Status: She is oriented to person, place, and time.      Gait: Gait normal.   Psychiatric:         Mood and Affect: Mood normal.         Behavior: Behavior normal.         Thought Content: Thought content normal.         Judgment: Judgment normal.         Diagnostic Data  Procedures    Assessment  Diagnoses and all orders for this visit:    1. Annual physical exam (Primary)  -     Vitamin B12 & Folate  -     Vitamin D,25-Hydroxy; Future  -     Lipid Panel  -     Hemoglobin A1c; Future  -     TSH Rfx On Abnormal To Free T4  -     Comprehensive Metabolic Panel  -     CBC (No Diff)    2. Bilateral foot pain  -     Ambulatory Referral to Podiatry    3. Elevated blood pressure  reading        Plan    1. Annual physical exam (Primary)- ordered fasting labs and follow-up with these.  Advised on nutrition and exercise and follow-up with us.    2. Bilateral foot pain- Ambulatory Referral to Podiatry    3. Elevated blood pressure reading- Advised patient to take blood pressure readings at home 2-3 times daily. Advised if blood pressure higher than 160/100 or lower than 100/60 to call the office immediately. If symptomatic, go to the ER. Patient verbalized understanding of all instructions given and complied.       Return in about 2 weeks (around 7/21/2025) for Recheck.    Emil Miller PA-C  07/07/2025

## 2025-07-08 NOTE — PROGRESS NOTES
Neuro Office Visit      Encounter Date: 2025   Patient Name: Tigist Ramos  : 1995   MRN: [id number]   PCP: LAUREN Addison  Chief Complaint:    Chief Complaint   Patient presents with    Numbness       History of Present Illness: Tigist Ramos is a 29 y.o. female who is here today in Neurology for  numbness and tingling with tourette syndrome    Last visit 2025-wear cock up splints. MRI brain  IMAGING SCANNED (2025)-mild WM lesions    History of Present Illness     Numbness  Continue to complain of numbness and tingling in hands and fingers while typing or playing [employer]. She forgot to get and wear cock up splints. Interested in possible injections.  PH  No recent episodes of numbness or tingling in the feet. The numbness is most pronounced in three fingers, leading to suspicion of carpal tunnel syndrome, although no formal diagnosis has been made. No attempt to use a splint for symptom management has been made   She has a history of vitamin B12 deficiency and takes supplements for this       EMG & Nerve Conduction Test (2024 13:35)mild senosory peripheral neuropathy.       Tic  Feels this is worse with stress and fatigue. No new sx  PH  She experiences an auditory tic, which began when she was 23 or 24 years old. Initially, it was associated with cold chills up her spine, but it has since become more frequent and intense, sometimes occurring 10 to 15 times in a row. The tic is more pronounced when she is tired or cold. She has seen a psychiatrist for ADHD and anxiety, who suggested that her tic may be related to anxiety.      She tested positive for factor V Leiden and is seeing a hematologist to assess her increased risk for blood clotting. She has stopped taking birth control due to this.       PMH: Leiden Factor V, HTN, anxiety, HLD, asthma, pcos, adhd, vit b12 def.  FH: anxiety, depression, drug abuse, etoh, htn  SH:, -tob, +etoh, +THC. Massage  therapist.   Subjective      Past Medical History:   Past Medical History:   Diagnosis Date    ADHD (attention deficit hyperactivity disorder) 2021 or 2022    Have not been formally diagnosed, but have been screened twice and psychiatrist agrees. Taking non stimulant medication for treatment    Allergic 2015    Seasonal    Anxiety 2019    Formal diagnosis. Taking medication and weekly therapy as treatment    Depression 2019    Formally diagnosed. Weekly therapy as treatment    Hyperlipidemia 4/5/2024    Hypertension 2022    I suspect it is mostly anxiety related, not formally diagnosed, 142/90 last time checked in August    Obesity Unknown    Since childhood. Most of family is obese. Am currently addressing with Dietitian.       Past Surgical History: No past surgical history on file.    Family History:   Family History   Problem Relation Age of Onset    Anxiety disorder Mother         I am willing to bet that the majority of my family has undiagnosed mental illness including anxiety, adhd, depression. They display all the symptoms but don't consider mental health and do not seek medical attention    Depression Mother     Drug abuse Mother         Cigarettes, casual pill-taker    Alcohol abuse Father         Father, several aunts and uncles, grandfather    Depression Father         Undiagnosed, has attempted suicide    Drug abuse Father         Cigarettes, painkillers    Heart attack Father         3 or 4 in lifetime. Smoking related    Alcohol abuse Paternal Grandfather     Depression Paternal Grandfather     Depression Brother         Two brothers have been diagnosed    Depression Brother     Hypertension Brother        Social History:   Social History     Socioeconomic History    Marital status:    Tobacco Use    Smoking status: Never     Passive exposure: Past    Smokeless tobacco: Never   Vaping Use    Vaping status: Never Used   Substance and Sexual Activity    Alcohol use: Yes     Alcohol/week: 2.0  standard drinks of alcohol     Types: 2 Cans of beer per week     Comment: Every other week, not weekly.    Drug use: Yes     Frequency: 10.0 times per week     Types: Marijuana     Comment: Only marijuana    Sexual activity: Yes     Partners: Female, Male     Birth control/protection: Condom, Birth control pill       Medications:     Current Outpatient Medications:     albuterol sulfate  (90 Base) MCG/ACT inhaler, Inhale 1-2 puffs As Needed. every 4 to 6 hours, Disp: , Rfl:     amphetamine-dextroamphetamine (ADDERALL) 10 MG tablet, Take 1 tablet by mouth Daily., Disp: , Rfl:     aspirin 81 MG EC tablet, Take 1 tablet by mouth Daily. (Patient taking differently: Take 1 tablet by mouth Daily. PRN), Disp: , Rfl:     cloNIDine (CATAPRES) 0.1 MG tablet, Take 1 tablet by mouth 2 (Two) Times a Day As Needed., Disp: , Rfl:     guanFACINE (TENEX) 2 MG tablet, Take 1 tablet every day by oral route., Disp: , Rfl:     ipratropium (ATROVENT) 0.06 % nasal spray, USE 1 TO 2 SPRAYS IN EACH NOSTRIL TWICE DAILY AS NEEDED, Disp: , Rfl:     ketoconazole (NIZORAL) 2 % shampoo, Apply  topically to the appropriate area as directed 1 (One) Time Per Week., Disp: , Rfl:     Omega-3 Fatty Acids (fish oil) 1000 MG capsule capsule, Take 1 capsule by mouth Daily With Breakfast., Disp: , Rfl:     venlafaxine XR (EFFEXOR-XR) 150 MG 24 hr capsule, Take 1 capsule by mouth Daily., Disp: , Rfl:     vitamin B-12 (CYANOCOBALAMIN) 1000 MCG tablet, Take 1 tablet by mouth Daily., Disp: 90 tablet, Rfl: 1    Allergies:   No Known Allergies    PHQ-9 Total Score:     STEADI Fall Risk Assessment has not been completed.    Objective     Physical Exam:   Physical Exam  Constitutional:       General: She is not in acute distress.     Appearance: Normal appearance. She is not ill-appearing or toxic-appearing.   HENT:      Head: Normocephalic and atraumatic.      Nose: Nose normal.   Eyes:      General:         Right eye: No discharge.         Left eye: No  "discharge.      Conjunctiva/sclera: Conjunctivae normal.   Pulmonary:      Effort: Pulmonary effort is normal. No respiratory distress.   Neurological:      General: No focal deficit present.      Mental Status: She is alert and oriented to person, place, and time. Mental status is at baseline.   Psychiatric:         Mood and Affect: Mood normal.         Behavior: Behavior normal.         Thought Content: Thought content normal.         Judgment: Judgment normal.         Neurological Exam  Mental Status  Alert. Oriented to person, place, and time.     Physical Exam        Vital Signs:   Vitals:    07/09/25 1115   BP: 128/84   Pulse: 86   SpO2: 98%   Weight: 127 kg (279 lb 3.2 oz)   Height: 165.1 cm (65\")     Body mass index is 46.46 kg/m².         Assessment / Plan      Assessment/Plan:   Diagnoses and all orders for this visit:    1. Numbness and tingling (Primary)  Comments:  Wear cock up splints  Orders:  -     Ambulatory Referral to Orthopedic Surgery    2. Bilateral carpal tunnel syndrome  Comments:  Wear cock up splints  Orders:  -     Ambulatory Referral to Orthopedic Surgery    3. Tourette syndrome  Comments:  Pt will pursue dietary supplements.       Assessment & Plan          Patient Education:       Reviewed medications, potential side effects and signs and symptoms to report. Discussed risk versus benefits of treatment plan with patient and/or family-including medications, labs and radiology that may be ordered. Addressed questions and concerns during visit. Patient and/or family verbalized understanding and agree with plan. Instructed to call the office with any questions and report to ER with any life-threatening symptoms.     Follow Up:   Return if symptoms worsen or fail to improve.    During this visit the following were done:  Labs Reviewed []    Labs Ordered []    Radiology Reports Reviewed []    Radiology Ordered []    PCP Records Reviewed []    Referring Provider Records Reviewed []    ER " Records Reviewed []    Hospital Records Reviewed []    History Obtained From Family []    Radiology Images Reviewed []    Other Reviewed []    Records Requested []      Patient or patient representative verbalized consent for the use of Ambient Listening during the visit with  Mei Sánchez DNP, APRN for chart documentation. 7/9/2025  16:04 EDT      Mei Sánchez DNP, APRN

## 2025-07-09 ENCOUNTER — OFFICE VISIT (OUTPATIENT)
Dept: NEUROLOGY | Facility: CLINIC | Age: 30
End: 2025-07-09
Payer: COMMERCIAL

## 2025-07-09 VITALS
DIASTOLIC BLOOD PRESSURE: 84 MMHG | OXYGEN SATURATION: 98 % | SYSTOLIC BLOOD PRESSURE: 128 MMHG | HEIGHT: 65 IN | HEART RATE: 86 BPM | BODY MASS INDEX: 46.52 KG/M2 | WEIGHT: 279.2 LBS

## 2025-07-09 DIAGNOSIS — R20.2 NUMBNESS AND TINGLING: Primary | ICD-10-CM

## 2025-07-09 DIAGNOSIS — F95.2 TOURETTE SYNDROME: ICD-10-CM

## 2025-07-09 DIAGNOSIS — G56.03 BILATERAL CARPAL TUNNEL SYNDROME: ICD-10-CM

## 2025-07-09 DIAGNOSIS — R20.0 NUMBNESS AND TINGLING: Primary | ICD-10-CM

## 2025-07-09 PROCEDURE — 99213 OFFICE O/P EST LOW 20 MIN: CPT | Performed by: NURSE PRACTITIONER

## 2025-07-16 ENCOUNTER — OFFICE VISIT (OUTPATIENT)
Dept: ORTHOPEDIC SURGERY | Facility: CLINIC | Age: 30
End: 2025-07-16
Payer: COMMERCIAL

## 2025-07-16 VITALS
DIASTOLIC BLOOD PRESSURE: 84 MMHG | BODY MASS INDEX: 46.48 KG/M2 | SYSTOLIC BLOOD PRESSURE: 128 MMHG | HEIGHT: 65 IN | WEIGHT: 279 LBS

## 2025-07-16 DIAGNOSIS — G56.03 CARPAL TUNNEL SYNDROME, BILATERAL: Primary | ICD-10-CM

## 2025-07-16 RX ORDER — LIDOCAINE HYDROCHLORIDE 10 MG/ML
0.5 INJECTION, SOLUTION EPIDURAL; INFILTRATION; INTRACAUDAL; PERINEURAL
Status: COMPLETED | OUTPATIENT
Start: 2025-07-16 | End: 2025-07-16

## 2025-07-16 RX ORDER — DEXAMETHASONE SODIUM PHOSPHATE 4 MG/ML
2 INJECTION, SOLUTION INTRA-ARTICULAR; INTRALESIONAL; INTRAMUSCULAR; INTRAVENOUS; SOFT TISSUE
Status: COMPLETED | OUTPATIENT
Start: 2025-07-16 | End: 2025-07-16

## 2025-07-16 RX ADMIN — DEXAMETHASONE SODIUM PHOSPHATE 2 MG: 4 INJECTION, SOLUTION INTRA-ARTICULAR; INTRALESIONAL; INTRAMUSCULAR; INTRAVENOUS; SOFT TISSUE at 13:10

## 2025-07-16 RX ADMIN — LIDOCAINE HYDROCHLORIDE 0.5 ML: 10 INJECTION, SOLUTION EPIDURAL; INFILTRATION; INTRACAUDAL; PERINEURAL at 13:10

## 2025-07-16 NOTE — PROGRESS NOTES
Procedure   - Hand/Upper Extremity Injection: bilateral carpal tunnel for carpal tunnel syndrome on 7/16/2025 1:10 PM  Indications: pain  Details: 27 G needle, volar approach  Medications (Right): 2 mg dexAMETHasone 4 MG/ML; 0.5 mL lidocaine PF 1% 1 %  Medications (Left): 2 mg dexAMETHasone 4 MG/ML; 0.5 mL lidocaine PF 1% 1 %  Outcome: tolerated well, no immediate complications  Procedure, treatment alternatives, risks and benefits explained, specific risks discussed. Consent was given by the patient. Immediately prior to procedure a time out was called to verify the correct patient, procedure, equipment, support staff and site/side marked as required. Patient was prepped and draped in the usual sterile fashion.

## 2025-07-16 NOTE — PROGRESS NOTES
[facility]     Office Visit       Date: 07/16/2025   Patient Name: Tigist Ramos  MRN: [id number]  YOB: 1995    Referring Physician: Mei Sánchez D*     Chief Complaint:   Chief Complaint   Patient presents with    Left Hand - Numbness    Right Hand - Numbness       History of Present Illness:   Tigist Ramos is a 29 y.o. female hand dominant presents with bilateral left greater than right hand numbness and tingling of 2 years duration.  She reports numbness and tingling in her bilateral thumb index middle and ring finger that is worse while working.  Reports symptoms resolved on their own.  Reports she occasionally wakes up in the morning with symptoms.  She is not tried bracing or anti-inflammatories.  She denies smoking.  She did have an EMG nerve study done 1 year ago which demonstrated evidence of peripheral neuropathy but no carpal tunnel syndrome.  She works as a massage therapist.      Subjective   Review of Systems:   Review of Systems   Constitutional:  Negative for chills, fever, unexpected weight gain and unexpected weight loss.   HENT:  Negative for congestion, postnasal drip and rhinorrhea.    Eyes:  Negative for blurred vision.   Respiratory:  Negative for shortness of breath.    Cardiovascular:  Negative for leg swelling.   Gastrointestinal:  Negative for abdominal pain, nausea and vomiting.   Genitourinary:  Negative for difficulty urinating.   Musculoskeletal:  Positive for arthralgias. Negative for gait problem, joint swelling and myalgias.   Skin:  Negative for skin lesions and wound.   Neurological:  Negative for dizziness, weakness, light-headedness and numbness.   Hematological:  Does not bruise/bleed easily.   Psychiatric/Behavioral:  Negative for depressed mood.         Pertinent review of systems per HPI.     I reviewed the patient's chief complaint, history of present illness,  "review of systems, past medical history, surgical history, family history, social history, medications and allergy list in the EMR on 07/16/2025 and agree with the findings above.    Objective    Vital Signs:   Vitals:    07/16/25 1253   BP: 128/84   Weight: 127 kg (279 lb)   Height: 165.1 cm (65\")     BMI: Body mass index is 46.43 kg/m².    General Appearance: No acute distress. Alert and oriented.     Chest:  Non-labored breathing on room air. Regular rate and rhythm.    Upper Extremity Exam:    Positive Tinel at the bilateral carpal tunnels.  Positive bilateral carpal compression test.  No thenar wasting bilaterally.  Negative Tinel at the bilateral cubital tunnels.  No intrinsic wasting.  Mild soreness over the pronator bilaterally.  Negative Tinel at the pronator bilaterally.    Fingers are warm, well-perfused with appropriate capillary refill.  Palpable radial pulse.    Sensation intact to light touch in median, radial and ulnar nerve distributions.    Motor- Fires [employer], ulnar intrinsics, EPL/EDC w/ full active and passive range of motion. Strength intact.    Non-tender except for in the areas highlighted    Imaging/Studies:   Imaging Results (Last 24 Hours)       ** No results found for the last 24 hours. **            Conduction studies from 2024 were independently reviewed and interpreted myself demonstrate no evidence of carpal tunnel syndrome.    Procedures:  Procedures    Quality Measures:   ACP:   ACP discussion was deferred.    Tobacco:   Tigist Ramos  reports that she has never smoked. She has been exposed to tobacco smoke. She has never used smokeless tobacco.      Assessment / Plan    Assessment/Plan:     There are no diagnoses linked to this encounter.     Tigist Ramosis a 29 y.o. female who presents with:      ICD-10-CM ICD-9-CM   1. Carpal tunnel syndrome, bilateral  G56.03 354.0         Patient presents with bilateral hand numbness and tingling evidence of carpal tunnel syndrome on " exam.  Discussed her nerve studies that she may have EMG negative for carpal tunnel syndrome.  We discussed the diagnosis as well as treatment of carpal tunnel syndrome including bracing anti-inflammatories corticosteroid ejections surgery.  Recommend bilateral carpal tunnel corticosteroid injection for both diagnostic and therapeutic purposes.  Recommend follow-up in 3 months.    Procedure Note- Carpal Tunnel Injection:    I discussed with the patient the potential benefits of performing therapeutic injections as well as potential risks including but not limited to infection, swelling, pain, bleeding, bruising, nerve/vessel damage, skin color changes, transient elevation in blood glucose levels, and fat atrophy. After informed consent and after the areas were prepped with chlorhexadine soap, ethyl chloride was used to numb the skin. The palmaris as a landmark, 2mg of dexamethasone and 0.5 cc of 1% lidocaine was injected into the bilateral carpal tunnel, taking care to avoid injecting directly into the median nerve.  The patient tolerated the procedure well. There were no complications. A sterile dressing was placed over the injection sites.      Follow Up:   Return in about 3 months (around 10/16/2025).        Jose Cooper MD  [facility] [place] [facility] Sur[facility]

## 2025-08-04 ENCOUNTER — OFFICE VISIT (OUTPATIENT)
Dept: INTERNAL MEDICINE | Age: 30
End: 2025-08-04
Payer: COMMERCIAL

## 2025-08-04 VITALS
BODY MASS INDEX: 46.08 KG/M2 | DIASTOLIC BLOOD PRESSURE: 84 MMHG | OXYGEN SATURATION: 98 % | WEIGHT: 276.6 LBS | SYSTOLIC BLOOD PRESSURE: 126 MMHG | HEIGHT: 65 IN | HEART RATE: 64 BPM | TEMPERATURE: 97.1 F

## 2025-08-04 DIAGNOSIS — F90.9 ATTENTION DEFICIT HYPERACTIVITY DISORDER (ADHD), UNSPECIFIED ADHD TYPE: ICD-10-CM

## 2025-08-04 DIAGNOSIS — F41.1 GAD (GENERALIZED ANXIETY DISORDER): ICD-10-CM

## 2025-08-04 DIAGNOSIS — M79.671 BILATERAL FOOT PAIN: Primary | ICD-10-CM

## 2025-08-04 DIAGNOSIS — M79.672 BILATERAL FOOT PAIN: Primary | ICD-10-CM

## 2025-08-04 DIAGNOSIS — R03.0 ELEVATED BLOOD PRESSURE READING: ICD-10-CM

## 2025-08-04 DIAGNOSIS — J45.20 MILD INTERMITTENT ASTHMA WITHOUT COMPLICATION: ICD-10-CM

## 2025-08-04 PROCEDURE — 99214 OFFICE O/P EST MOD 30 MIN: CPT | Performed by: PHYSICIAN ASSISTANT

## 2025-08-15 ENCOUNTER — TELEMEDICINE (OUTPATIENT)
Dept: INTERNAL MEDICINE | Age: 30
End: 2025-08-15
Payer: COMMERCIAL

## 2025-08-15 DIAGNOSIS — F90.9 ATTENTION DEFICIT HYPERACTIVITY DISORDER (ADHD), UNSPECIFIED ADHD TYPE: ICD-10-CM

## 2025-08-15 DIAGNOSIS — F41.1 GAD (GENERALIZED ANXIETY DISORDER): ICD-10-CM

## 2025-08-15 DIAGNOSIS — B96.89 ACUTE BACTERIAL SINUSITIS: Primary | ICD-10-CM

## 2025-08-15 DIAGNOSIS — J01.90 ACUTE BACTERIAL SINUSITIS: Primary | ICD-10-CM

## 2025-08-15 DIAGNOSIS — J45.20 MILD INTERMITTENT ASTHMA WITHOUT COMPLICATION: ICD-10-CM

## 2025-08-15 RX ORDER — AZITHROMYCIN 250 MG/1
TABLET, FILM COATED ORAL
Qty: 6 TABLET | Refills: 0 | Status: SHIPPED | OUTPATIENT
Start: 2025-08-15

## 2025-08-18 RX ORDER — FLUCONAZOLE 150 MG/1
150 TABLET ORAL DAILY
Qty: 2 TABLET | Refills: 1 | Status: SHIPPED | OUTPATIENT
Start: 2025-08-18